# Patient Record
Sex: MALE | Race: WHITE | NOT HISPANIC OR LATINO | Employment: FULL TIME | URBAN - NONMETROPOLITAN AREA
[De-identification: names, ages, dates, MRNs, and addresses within clinical notes are randomized per-mention and may not be internally consistent; named-entity substitution may affect disease eponyms.]

---

## 2019-11-05 ENCOUNTER — OUTSIDE FACILITY SERVICE (OUTPATIENT)
Dept: CARDIOLOGY | Facility: CLINIC | Age: 64
End: 2019-11-05

## 2019-11-05 PROCEDURE — 99205 OFFICE O/P NEW HI 60 MIN: CPT | Performed by: INTERNAL MEDICINE

## 2019-11-06 ENCOUNTER — OUTSIDE FACILITY SERVICE (OUTPATIENT)
Dept: CARDIOLOGY | Facility: CLINIC | Age: 64
End: 2019-11-06

## 2019-11-06 DIAGNOSIS — R94.39 ABNORMAL NUCLEAR STRESS TEST: Primary | ICD-10-CM

## 2019-11-06 PROCEDURE — 99214 OFFICE O/P EST MOD 30 MIN: CPT | Performed by: INTERNAL MEDICINE

## 2019-11-07 ENCOUNTER — TELEPHONE (OUTPATIENT)
Dept: CARDIOLOGY | Facility: CLINIC | Age: 64
End: 2019-11-07

## 2019-11-07 NOTE — TELEPHONE ENCOUNTER
KOTA FROM THE [facility]  LINE CALLED SAID PATIENT NEEDED A FOLLOW UP IN 7-10 DAYS. WHEN DO YOU WANT PATIENT TO FOLLOW UP?

## 2019-11-08 ENCOUNTER — OUTSIDE FACILITY SERVICE (OUTPATIENT)
Dept: CARDIOLOGY | Facility: CLINIC | Age: 64
End: 2019-11-08

## 2019-11-08 PROCEDURE — 93458 L HRT ARTERY/VENTRICLE ANGIO: CPT | Performed by: INTERNAL MEDICINE

## 2019-11-08 NOTE — TELEPHONE ENCOUNTER
PATIENT IS SCHEDULED FOR 12/09/2019 @ 9:30 FOR A HOSP FOLLOW UP. CALLED KOTA AND SHE IS AWARE OF APPT AND I TOLD HER I HAD ALSO GIVEN THE APPT TO THE HOSP AFTER HIS HEART CATH.

## 2019-12-09 ENCOUNTER — OFFICE VISIT (OUTPATIENT)
Dept: CARDIOLOGY | Facility: CLINIC | Age: 64
End: 2019-12-09

## 2019-12-09 VITALS
DIASTOLIC BLOOD PRESSURE: 84 MMHG | HEART RATE: 72 BPM | BODY MASS INDEX: 34.36 KG/M2 | SYSTOLIC BLOOD PRESSURE: 138 MMHG | HEIGHT: 70 IN | WEIGHT: 240 LBS

## 2019-12-09 DIAGNOSIS — I10 ESSENTIAL HYPERTENSION: Primary | ICD-10-CM

## 2019-12-09 DIAGNOSIS — I25.9 IHD (ISCHEMIC HEART DISEASE): ICD-10-CM

## 2019-12-09 DIAGNOSIS — F17.200 CURRENT SMOKER ON SOME DAYS: ICD-10-CM

## 2019-12-09 DIAGNOSIS — E66.9 OBESITY (BMI 30.0-34.9): ICD-10-CM

## 2019-12-09 DIAGNOSIS — E78.5 HYPERLIPIDEMIA LDL GOAL <70: ICD-10-CM

## 2019-12-09 PROCEDURE — 99214 OFFICE O/P EST MOD 30 MIN: CPT | Performed by: NURSE PRACTITIONER

## 2019-12-09 RX ORDER — NITROGLYCERIN 0.4 MG/1
0.4 TABLET SUBLINGUAL
COMMUNITY
End: 2020-09-16 | Stop reason: SDUPTHER

## 2019-12-09 RX ORDER — ATORVASTATIN CALCIUM 40 MG/1
40 TABLET, FILM COATED ORAL EVERY OTHER DAY
COMMUNITY
End: 2019-12-09 | Stop reason: SDUPTHER

## 2019-12-09 RX ORDER — LOSARTAN POTASSIUM AND HYDROCHLOROTHIAZIDE 12.5; 1 MG/1; MG/1
1 TABLET ORAL DAILY
COMMUNITY
End: 2020-11-18

## 2019-12-09 RX ORDER — ATORVASTATIN CALCIUM 40 MG/1
40 TABLET, FILM COATED ORAL EVERY OTHER DAY
Qty: 90 TABLET | Refills: 2 | Status: SHIPPED | OUTPATIENT
Start: 2019-12-09 | End: 2020-11-18

## 2019-12-09 NOTE — PROGRESS NOTES
Chief Complaint   Patient presents with   • Hospital Follow-up     Patient had cardiac catheterization with stenting on 11/08/19. Patient also had stress, echo, and labs, in door with cath records. States that last night when he was trying to take a deep breath he was getting a little pain. Reports that he has had some shortness of breath since cath, states that it could be Brilinta. States that he has not had morning dose of Brilinta because he is out.   • Aspirin     Patient is on aspirin.    • Med Refill     Needs refills on Brilinta and atorvastatin. 90 day supplies to [facility]. Brought medications with visit.        Cardiac Complaints  dyspnea      Subjective   Raciel Peter is a 64 y.o. male with HTN, hyperlipidemia with statin intolerance, tobacco abuse, and IHD.  On 11/4/2019, he developed severe chest tightness/pressure in his chest returning from a home at work. Patient stated with the pain he became very fatigued and weak. He went to the ER at [facility] and given NTG paste which alleviated the symptoms after a few minutes. MI was ruled out by enzymes and EKG criteria.  Echo was read as normal but stress test read with anterior and inferior ischemia with normal LV function. It was decided to take the patient to cath lab to evaluate. Cardiac cath revealed a 75-85% stenosis of the LAD with FFR noted at 0.78, single resolute stent was placed to the proximal/mid portion of the LAD.  The diagonal then became jailed and PTCA was done.  At the end of the stent, there was another area of stenosis of around 75-85% and another single resolute stent was placed.  It was discussed to add PCSK9 for cholesterol management to be done as outpatient.  ASA was added along with Brilinta therapy.  It appears he was also added on atorvastatin at that time, despite prior admission of intolerance.    He returns today for follow up and new concerns are denied. Chest pain, dizziness, palpitations, and syncope are denied. He  does admit to some shortness of breath with Brilinta therapy but admits this has slowly improved. Patient does report taking with coffee.  He does state last night with deep breathing he developed a little ache in his chest after dinner but admits it was done with activity and with normal breathing as the night went on. He denies any pressure/pain similar to before admission to [facility].  He reports no decline in activity and states he is planning to resume walking regimen this week. He does admit to bleeding and Brilinta and ASA but denies any bleeding in urine or stools but reports that with hitting his arms/etc he bleeds more freely.  He is requesting Refills of Brilinta and lipitor as he is out of both.  He does report tolerating lipitor well so far at every other day dosing.  Labs from hospital in November show:  H/H 16.1/46.7, negative troponin x 3, Na 140, K 3.6, , BUN 17, Creatinine 1.4, TRIG 492, HDL 29, , ALT 21, AST 12, Mag 2.1.  Patient does report he has really been limiting his tobacco use and is down to 1-2 cigarettes nightly in regards.          Cardiac History  Past Surgical History:   Procedure Laterality Date   • CARDIAC CATHETERIZATION  11/08/2019    75-85% LAD. FFR- 0.78. 2.75x26 & 2.5x8 Resolute Stents. PTCA D1   • CARDIOVASCULAR STRESS TEST  11/05/2019    @ [facility]. Dr. Aguilar- 4 Min.35 Secs. 85% THR. EF 61%. Anterior & Inferior Ischemia.   • ECHO - CONVERTED  11/04/2019    @ [facility]. Dr. Hernandez- EF 60%. RVSP- 32 mmHg.       Current Outpatient Medications   Medication Sig Dispense Refill   • atorvastatin (LIPITOR) 40 MG tablet Take 1 tablet by mouth Every Other Day. 90 tablet 2   • losartan-hydrochlorothiazide (HYZAAR) 100-12.5 MG per tablet Take 1 tablet by mouth Daily.     • metoprolol tartrate (LOPRESSOR) 25 MG tablet Take 12.5 mg by mouth 2 (Two) Times a Day.     • nitroglycerin (NITROSTAT) 0.4 MG SL tablet Place 0.4 mg under the tongue Every 5 (Five) Minutes As Needed for Chest Pain.  "Take no more than 3 doses in 15 minutes.     • ticagrelor (BRILINTA) 90 MG tablet tablet Take 1 tablet by mouth 2 (Two) Times a Day. 180 tablet 2     No current facility-administered medications for this visit.        Shellfish-derived products    Past Medical History:   Diagnosis Date   • History of hernia surgery    • Hyperlipidemia    • Hypertension        Social History     Socioeconomic History   • Marital status: Single     Spouse name: Not on file   • Number of children: Not on file   • Years of education: Not on file   • Highest education level: Not on file   Tobacco Use   • Smoking status: Former Smoker     Years: 40.00     Last attempt to quit: 2019     Years since quittin.0   • Smokeless tobacco: Never Used   Substance and Sexual Activity   • Alcohol use: Never     Frequency: Never   • Drug use: Never       History reviewed. No pertinent family history.    Review of Systems   Constitution: Negative for malaise/fatigue and night sweats.   Cardiovascular: Positive for dyspnea on exertion. Negative for chest pain, claudication, irregular heartbeat, leg swelling, near-syncope, orthopnea, palpitations and syncope.   Respiratory: Positive for shortness of breath. Negative for cough and wheezing.    Musculoskeletal: Positive for joint pain and stiffness. Negative for back pain.   Gastrointestinal: Negative for anorexia, heartburn, nausea and vomiting.   Genitourinary: Negative for dysuria, hematuria, hesitancy and nocturia.   Neurological: Negative for dizziness, headaches, light-headedness and weakness.   Psychiatric/Behavioral: Negative for depression and memory loss. The patient is not nervous/anxious.            Objective     /84 (BP Location: Left arm)   Pulse 72   Ht 177.8 cm (70\")   Wt 109 kg (240 lb)   BMI 34.44 kg/m²     Physical Exam   Constitutional: He is oriented to person, place, and time. He appears well-developed and well-nourished.   HENT:   Head: Normocephalic and atraumatic. "   Eyes: Pupils are equal, round, and reactive to light. EOM are normal.   Neck: Normal range of motion. Neck supple.   Cardiovascular: Normal rate and regular rhythm.   Murmur heard.  Pulmonary/Chest: Effort normal and breath sounds normal.   Abdominal: Soft.   Musculoskeletal: Normal range of motion.   Neurological: He is alert and oriented to person, place, and time.   Skin: Skin is warm and dry.   Psychiatric: He has a normal mood and affect. His behavior is normal.       Procedures    Assessment/Plan     IHD:  Most recent cath findings discussed with patient with 70-80% stenosis of mid LAD stented with 2 resolute stents, with stenosis down to 0% after intervention.  He denies any cardiac concerns since stenting and admits to taking DAPT therapy as advised. Patient has not yet had morning Brilinta as he is out but will be picking up after visit as script was sent. Bleeding is reported with injury but excessive bleeding, hematuria, and bloody stools denied. Same continued. Caffeine with Brilinta urged to limit side effects such as shortness of breath. Answers in regards to cath findings and does and dont's after stenting discussed.    HTN:  Well managed on current ARB and BB therapy.  No adjustment to either recommended.  Patient encouraged to limit his sodium intake to aid in management.      Hyperlipidemia: Patient is now taking lipitor therapy since cath and reports doing well with QOD dosing, myalgias and other side effects denied. Most recent FLP from hospital shows TRIG well over 400.  Patient urged to continue with statin therapy and limit intake of carb/sugars.      He has limited his tobacco abuse and is now only smoking a cigarette a day and he was praised for his efforts. He was urged to continue with cessation fully.      BMI noted at 34.44.  Patient admits to weight gain since cath as he has been eating more since he is not smoking. Patient urged on limitation with carbs, calories, and restaurant/fast  food intake. He was also urged to begin exercise regimen once again with walking.     Refills of cardiac meds sent per request.    Patient does report having NTG SL to use if needed for chest pain.  Discussion about when to use was had.    6 month follow up recommended or sooner if needed. Patient urged to call with concerns.                Problems Addressed this Visit        Cardiovascular and Mediastinum    IHD (ischemic heart disease)    Relevant Medications    nitroglycerin (NITROSTAT) 0.4 MG SL tablet    metoprolol tartrate (LOPRESSOR) 25 MG tablet    ticagrelor (BRILINTA) 90 MG tablet tablet      Other Visit Diagnoses     Essential hypertension    -  Primary    Relevant Medications    metoprolol tartrate (LOPRESSOR) 25 MG tablet    losartan-hydrochlorothiazide (HYZAAR) 100-12.5 MG per tablet    Hyperlipidemia LDL goal <70        Relevant Medications    atorvastatin (LIPITOR) 40 MG tablet    Current smoker on some days        Obesity (BMI 30.0-34.9)              Patient's Body mass index is 34.44 kg/m². BMI is above normal parameters. Recommendations include: nutrition counseling.      Raciel KATHARINA Peter is a current  Some day smoker who is working hard on cessation.          Electronically signed by PHILIPP Amador December 9, 2019 4:50 PM

## 2020-06-11 PROBLEM — F17.210 CIGARETTE SMOKER: Status: ACTIVE | Noted: 2020-06-11

## 2020-06-11 PROBLEM — E78.00 PURE HYPERCHOLESTEROLEMIA: Status: ACTIVE | Noted: 2020-06-11

## 2020-06-11 PROBLEM — I10 ESSENTIAL HYPERTENSION: Status: ACTIVE | Noted: 2020-06-11

## 2020-09-16 ENCOUNTER — TELEPHONE (OUTPATIENT)
Dept: CARDIOLOGY | Facility: CLINIC | Age: 65
End: 2020-09-16

## 2020-09-16 RX ORDER — NITROGLYCERIN 0.4 MG/1
0.4 TABLET SUBLINGUAL
Qty: 25 TABLET | Refills: 1 | Status: SHIPPED | OUTPATIENT
Start: 2020-09-16 | End: 2022-07-26 | Stop reason: SDUPTHER

## 2020-09-16 RX ORDER — NITROGLYCERIN 0.4 MG/1
0.4 TABLET SUBLINGUAL
Qty: 25 TABLET | Refills: 1 | Status: SHIPPED | OUTPATIENT
Start: 2020-09-16 | End: 2020-09-16 | Stop reason: SDUPTHER

## 2020-09-16 NOTE — TELEPHONE ENCOUNTER
[facility] with [facility] called asking for script for nitrostat 0.4 mg SL tablet PRN. Script sent.

## 2020-11-18 ENCOUNTER — OFFICE VISIT (OUTPATIENT)
Dept: CARDIOLOGY | Facility: CLINIC | Age: 65
End: 2020-11-18

## 2020-11-18 ENCOUNTER — TELEPHONE (OUTPATIENT)
Dept: CARDIOLOGY | Facility: CLINIC | Age: 65
End: 2020-11-18

## 2020-11-18 VITALS
BODY MASS INDEX: 28.06 KG/M2 | TEMPERATURE: 98.8 F | HEIGHT: 70 IN | HEART RATE: 60 BPM | WEIGHT: 196 LBS | DIASTOLIC BLOOD PRESSURE: 62 MMHG | SYSTOLIC BLOOD PRESSURE: 118 MMHG

## 2020-11-18 DIAGNOSIS — F17.210 CIGARETTE SMOKER: ICD-10-CM

## 2020-11-18 DIAGNOSIS — I10 ESSENTIAL HYPERTENSION: ICD-10-CM

## 2020-11-18 DIAGNOSIS — I25.9 IHD (ISCHEMIC HEART DISEASE): Primary | ICD-10-CM

## 2020-11-18 DIAGNOSIS — E78.00 PURE HYPERCHOLESTEROLEMIA: ICD-10-CM

## 2020-11-18 DIAGNOSIS — Z79.899 MEDICATION MANAGEMENT: ICD-10-CM

## 2020-11-18 PROCEDURE — 99214 OFFICE O/P EST MOD 30 MIN: CPT | Performed by: NURSE PRACTITIONER

## 2020-11-18 RX ORDER — LOSARTAN POTASSIUM 100 MG/1
100 TABLET ORAL DAILY
Qty: 90 TABLET | Refills: 3 | Status: SHIPPED | OUTPATIENT
Start: 2020-11-18 | End: 2021-11-15

## 2020-11-18 NOTE — PROGRESS NOTES
Chief Complaint   Patient presents with   • Follow-up     for cardiac management   • Labs     PCP checked last week, to get results this afternoon   • Med Refill     refills needed on Brilinta, 90 days to [facility]   • Medication Problem     unable to take atorvastatin due to myalgia and severe headache.        Subjective       Raciel Peter is a 65 y.o. male with HTN, hyperlipidemia with statin intolerance, tobacco abuse, and IHD.  On 11/4/2019, he developed severe chest tightness/pressure in his chest returning from a home at work. Patient stated with the pain he became very fatigued and weak. He went to the ER at [facility] and given NTG paste which alleviated the symptoms after a few minutes. MI was ruled out by enzymes and EKG criteria.  Echo was read as normal but stress test read with anterior and inferior ischemia with normal LV function. It was decided to take the patient to cath lab to evaluate. Cardiac cath revealed a 75-85% stenosis of the LAD with FFR noted at 0.78, single resolute stent was placed to the proximal/mid portion of the LAD.  The diagonal then became jailed and PTCA was done.  At the end of the stent, there was another area of stenosis of around 75-85% and another single resolute stent was placed.  It was discussed to add PCSK9 for cholesterol management to be done as outpatient.  ASA was added along with Brilinta therapy.  It appears he was also added on atorvastatin at that time, despite prior admission of intolerance.    Today he comes to the office for a follow up visit. He has changed his diet and lost 40 pounds. He has cut back on smoking. He presents today without cardiac complaints. He is concerned about lower blood pressure since weight loss, which makes him feel a little weaker. No change in Hyzaar or Metoprolol noted.  Due to excessive bruising and bleeding he stopped aspirin but continues Brilinta without issue.        Cardiac History:    Past Surgical History:   Procedure Laterality  Date   • CARDIAC CATHETERIZATION  2019    75-85% LAD. FFR- 0.78. 2.75x26 & 2.5x8 Resolute Stents. PTCA D1   • CARDIOVASCULAR STRESS TEST  2019    @ [facility]. Dr. Aguilar- 4 Min.35 Secs. 85% THR. EF 61%. Anterior & Inferior Ischemia.   • ECHO - CONVERTED  2019    @ [facility]. Dr. Hernandez- EF 60%. RVSP- 32 mmHg.       Current Outpatient Medications   Medication Sig Dispense Refill   • metoprolol tartrate (LOPRESSOR) 25 MG tablet Take 0.5 tablets by mouth 2 (Two) Times a Day. 90 tablet 3   • nitroglycerin (NITROSTAT) 0.4 MG SL tablet Place 1 tablet under the tongue Every 5 (Five) Minutes As Needed for Chest Pain. Take no more than 3 doses in 15 minutes. Need appt for further refills. 25 tablet 1   • losartan (Cozaar) 100 MG tablet Take 1 tablet by mouth Daily. 90 tablet 3   • ticagrelor (Brilinta) 60 MG tablet tablet Take 1 tablet by mouth 2 (Two) Times a Day. 180 tablet 3     No current facility-administered medications for this visit.        Shellfish-derived products and Lipitor [atorvastatin calcium]    Past Medical History:   Diagnosis Date   • History of hernia surgery    • Hyperlipidemia    • Hypertension        Social History     Socioeconomic History   • Marital status: Single     Spouse name: Not on file   • Number of children: Not on file   • Years of education: Not on file   • Highest education level: Not on file   Tobacco Use   • Smoking status: Current Some Day Smoker     Years: 40.00     Types: Cigarettes     Last attempt to quit: 2019     Years since quittin.0   • Smokeless tobacco: Never Used   • Tobacco comment: trying to quit again    Substance and Sexual Activity   • Alcohol use: Never     Frequency: Never   • Drug use: Never       History reviewed. No pertinent family history.    Review of Systems   Constitution: Positive for malaise/fatigue (mild with lower blood pressure) and weight loss (intentional). Negative for decreased appetite and diaphoresis.   HENT: Negative for  "nosebleeds.    Eyes: Negative for blurred vision.   Cardiovascular: Negative for chest pain, claudication, cyanosis, dyspnea on exertion, irregular heartbeat, leg swelling, near-syncope, orthopnea, palpitations, paroxysmal nocturnal dyspnea and syncope.   Respiratory: Negative for shortness of breath.    Endocrine: Negative for cold intolerance and heat intolerance.   Hematologic/Lymphatic: Does not bruise/bleed easily.   Skin: Negative for rash.   Musculoskeletal: Negative for myalgias.   Gastrointestinal: Negative for heartburn, melena and nausea.   Genitourinary: Negative for dysuria and hematuria.   Neurological: Positive for vertigo (by history). Negative for dizziness, light-headedness, loss of balance and weakness.   Psychiatric/Behavioral: The patient does not have insomnia and is not nervous/anxious.         Objective      November 2019:  H/H 16.1/46.7, negative troponin x 3, Na 140, K 3.6, , BUN 17, Creatinine 1.4, TRIG 492, HDL 29, , ALT 21, AST 12, Mag 2.1.     /62   Pulse 60   Temp 98.8 °F (37.1 °C)   Ht 177.8 cm (70\")   Wt 88.9 kg (196 lb)   BMI 28.12 kg/m²     Vitals signs and nursing note reviewed.   Eyes:      Pupils: Pupils are equal, round, and reactive to light.   HENT:      Head: Normocephalic.   Neck:      Musculoskeletal: Normal range of motion.      Vascular: No carotid bruit.   Pulmonary:      Breath sounds: Normal breath sounds.   Cardiovascular:      Normal rate. Regular rhythm.   Abdominal:      General: Bowel sounds are normal.      Palpations: Abdomen is soft.   Musculoskeletal: Normal range of motion.   Skin:     General: Skin is warm.   Neurological:      Mental Status: Alert and oriented to person, place, and time.        Procedures: none today       Problem List Items Addressed This Visit        Cardiovascular and Mediastinum    IHD (ischemic heart disease) - Primary    Relevant Medications    ticagrelor (Brilinta) 60 MG tablet tablet    metoprolol tartrate " "(LOPRESSOR) 25 MG tablet    Essential hypertension    Relevant Medications    metoprolol tartrate (LOPRESSOR) 25 MG tablet    losartan (Cozaar) 100 MG tablet    Pure hypercholesterolemia       Other    Cigarette smoker      Other Visit Diagnoses     Medication management             IHD- asymptomatic. Continue brilinita at maintenance dose. Aspirin not restarted due to causing increased bruising and bleeding. We discussed cardiac workup next year unless symptoms or concerns develop sooner.     HTN- well controlled and reported a \"low\" at times per patient. Advised to stop Hyzaar and start Cozaar. Continue low dose Metoprolol tartrate. Continue to monitor blood pressure and call if not at goal.     Hypercholesterolemia- currently diet controlled. He has had labs drawn last week. Results requested. Further recommendations based on results. He may benefit in PCSK9 inhibitor as he has not tolerated Lipitor.     Patient's Body mass index is 28.12 kg/m². BMI is above normal parameters. Recommendations include: nutrition counseling. I complimented him on weight loss and to continue diet/exercise.      Raciel KATHARINA Peter  reports that he has been smoking cigarettes. He has smoked for the past 40.00 years. He has never used smokeless tobacco.. I have educated him on the risk of diseases from using tobacco products such as cancer, COPD and heart disease.  I advised him to quit and he is willing to quit. He is currently \"cutting back\".      Patient appears stable from a cardiac standpoint. Continue same plan of care. A 6 month follow up visit scheduled. Please call sooner for cardiac concerns..            Electronically signed by PHILIPP Saunders,  November 18, 2020 09:21 EST  "

## 2020-11-18 NOTE — TELEPHONE ENCOUNTER
Carlos from [facility] called concerning refill on Brilinta sent today, informed [facility], script for Brilinta should read Brilinta 60mg bid.

## 2020-11-19 RX ORDER — ROSUVASTATIN CALCIUM 10 MG/1
TABLET, COATED ORAL
Qty: 90 TABLET | Refills: 3 | Status: SHIPPED | OUTPATIENT
Start: 2020-11-19 | End: 2021-05-27 | Stop reason: DRUGHIGH

## 2020-11-19 NOTE — TELEPHONE ENCOUNTER
----- Message from PHILIPP Phillips sent at 11/18/2020  2:54 PM EST -----  Please inform B12 a little low. Renea will address when he sees her to establish care. Also PSA slightly increased 4.3. His  and , which he is currently controlling by diet. Historically he has TC in 400s. He did not tolerate LIpitor. Would he be willing to try a different statin? We could try Crestor 10 mg daily and see how he tolerates.

## 2020-11-19 NOTE — TELEPHONE ENCOUNTER
Pt aware of results and recommendations to discuss results with PCP, to start Crestor 10 mg daily. He would definitely be willing to try the Crestor.  I advised him to start with 1/2 tab and increase to 1 tab as tolerates. Aware to call if having any problems.

## 2021-03-03 ENCOUNTER — TELEPHONE (OUTPATIENT)
Dept: CARDIOLOGY | Facility: CLINIC | Age: 66
End: 2021-03-03

## 2021-05-20 ENCOUNTER — OFFICE VISIT (OUTPATIENT)
Dept: CARDIOLOGY | Facility: CLINIC | Age: 66
End: 2021-05-20

## 2021-05-20 ENCOUNTER — LAB (OUTPATIENT)
Dept: LAB | Facility: HOSPITAL | Age: 66
End: 2021-05-20

## 2021-05-20 VITALS
SYSTOLIC BLOOD PRESSURE: 142 MMHG | DIASTOLIC BLOOD PRESSURE: 80 MMHG | HEIGHT: 70 IN | HEART RATE: 72 BPM | BODY MASS INDEX: 31.5 KG/M2 | WEIGHT: 220 LBS

## 2021-05-20 DIAGNOSIS — I25.9 IHD (ISCHEMIC HEART DISEASE): Primary | ICD-10-CM

## 2021-05-20 DIAGNOSIS — Z79.899 MEDICATION MANAGEMENT: ICD-10-CM

## 2021-05-20 DIAGNOSIS — Z87.891 FORMER SMOKER: ICD-10-CM

## 2021-05-20 DIAGNOSIS — E78.00 PURE HYPERCHOLESTEROLEMIA: ICD-10-CM

## 2021-05-20 DIAGNOSIS — I10 ESSENTIAL HYPERTENSION: ICD-10-CM

## 2021-05-20 DIAGNOSIS — I25.9 IHD (ISCHEMIC HEART DISEASE): ICD-10-CM

## 2021-05-20 PROBLEM — F17.210 CIGARETTE SMOKER: Status: RESOLVED | Noted: 2020-06-11 | Resolved: 2021-05-20

## 2021-05-20 LAB
ALBUMIN SERPL-MCNC: 4.19 G/DL (ref 3.5–5.2)
ALBUMIN/GLOB SERPL: 1.7 G/DL
ALP SERPL-CCNC: 74 U/L (ref 39–117)
ALT SERPL W P-5'-P-CCNC: 21 U/L (ref 1–41)
ANION GAP SERPL CALCULATED.3IONS-SCNC: 7.2 MMOL/L (ref 5–15)
AST SERPL-CCNC: 17 U/L (ref 1–40)
BILIRUB SERPL-MCNC: 1.6 MG/DL (ref 0–1.2)
BUN SERPL-MCNC: 16 MG/DL (ref 8–23)
BUN/CREAT SERPL: 11.5 (ref 7–25)
CALCIUM SPEC-SCNC: 9.4 MG/DL (ref 8.6–10.5)
CHLORIDE SERPL-SCNC: 102 MMOL/L (ref 98–107)
CHOLEST SERPL-MCNC: 184 MG/DL (ref 0–200)
CO2 SERPL-SCNC: 27.8 MMOL/L (ref 22–29)
CREAT SERPL-MCNC: 1.39 MG/DL (ref 0.76–1.27)
DEPRECATED RDW RBC AUTO: 40.8 FL (ref 37–54)
ERYTHROCYTE [DISTWIDTH] IN BLOOD BY AUTOMATED COUNT: 12.3 % (ref 12.3–15.4)
GFR SERPL CREATININE-BSD FRML MDRD: 51 ML/MIN/1.73
GLOBULIN UR ELPH-MCNC: 2.4 GM/DL
GLUCOSE SERPL-MCNC: 108 MG/DL (ref 65–99)
HCT VFR BLD AUTO: 51.9 % (ref 37.5–51)
HDLC SERPL-MCNC: 43 MG/DL (ref 40–60)
HGB BLD-MCNC: 17.5 G/DL (ref 13–17.7)
LDLC SERPL CALC-MCNC: 120 MG/DL (ref 0–100)
LDLC/HDLC SERPL: 2.73 {RATIO}
MCH RBC QN AUTO: 30.8 PG (ref 26.6–33)
MCHC RBC AUTO-ENTMCNC: 33.7 G/DL (ref 31.5–35.7)
MCV RBC AUTO: 91.2 FL (ref 79–97)
PLATELET # BLD AUTO: 194 10*3/MM3 (ref 140–450)
PMV BLD AUTO: 9.3 FL (ref 6–12)
POTASSIUM SERPL-SCNC: 4.7 MMOL/L (ref 3.5–5.2)
PROT SERPL-MCNC: 6.6 G/DL (ref 6–8.5)
RBC # BLD AUTO: 5.69 10*6/MM3 (ref 4.14–5.8)
SODIUM SERPL-SCNC: 137 MMOL/L (ref 136–145)
TRIGL SERPL-MCNC: 119 MG/DL (ref 0–150)
VLDLC SERPL-MCNC: 21 MG/DL (ref 5–40)
WBC # BLD AUTO: 7.02 10*3/MM3 (ref 3.4–10.8)

## 2021-05-20 PROCEDURE — 80061 LIPID PANEL: CPT

## 2021-05-20 PROCEDURE — 80053 COMPREHEN METABOLIC PANEL: CPT

## 2021-05-20 PROCEDURE — 99213 OFFICE O/P EST LOW 20 MIN: CPT | Performed by: NURSE PRACTITIONER

## 2021-05-20 PROCEDURE — 85027 COMPLETE CBC AUTOMATED: CPT

## 2021-05-20 PROCEDURE — 36415 COLL VENOUS BLD VENIPUNCTURE: CPT

## 2021-05-20 NOTE — PATIENT INSTRUCTIONS
Advance Care Planning and Advance Directives     You make decisions on a daily basis - decisions about where you want to live, your career, your home, your life. Perhaps one of the most important decisions you face is your choice for future medical care. Take time to talk with your family and your healthcare team and start planning today.  Advance Care Planning is a process that can help you:  · Understand possible future healthcare decisions in light of your own experiences  · Reflect on those decision in light of your goals and values  · Discuss your decisions with those closest to you and the healthcare professionals that care for you  · Make a plan by creating a document that reflects your wishes    Surrogate Decision Maker  In the event of a medical emergency, which has left you unable to communicate or to make your own decisions, you would need someone to make decisions for you.  It is important to discuss your preferences for medical treatment with this person while you are in good health.     Qualities of a surrogate decision maker:  • Willing to take on this role and responsibility  • Knows what you want for future medical care  • Willing to follow your wishes even if they don't agree with them  • Able to make difficult medical decisions under stressful circumstances    Advance Directives  These are legal documents you can create that will guide your healthcare team and decision maker(s) when needed. These documents can be stored in the electronic medical record.    · Living Will - a legal document to guide your care if you have a terminal condition or a serious illness and are unable to communicate. States vary by statute in document names/types, but most forms may include one or more of the following:        -  Directions regarding life-prolonging treatments        -  Directions regarding artificially provided nutrition/hydration        -  Choosing a healthcare decision maker        -  Direction  regarding organ/tissue donation    · Durable Power of  for Healthcare - this document names an -in-fact to make medical decisions for you, but it may also allow this person to make personal and financial decisions for you. Please seek the advice of an  if you need this type of document.    **Advance Directives are not required and no one may discriminate against you if you do not sign one.    Medical Orders  Many states allow specific forms/orders signed by your physician to record your wishes for medical treatment in your current state of health. This form, signed in personal communication with your physician, addresses resuscitation and other medical interventions that you may or may not want.      For more information or to schedule a time with a [facility] Advance Care Planning Facilitator contact: [email or web address]  Food Choices for Gastroesophageal Reflux Disease, Adult  When you have gastroesophageal reflux disease (GERD), the foods you eat and your eating habits are very important. Choosing the right foods can help ease the discomfort of GERD. Consider working with a dietitian to help you make healthy food choices.  What are tips for following this plan?  Reading food labels  · Read the label for foods that are low in saturated fat. Foods that have less than 5% of daily value (DV) of fat and 0 g of trans fats may help with your symptoms.  Cooking  · Cook foods using methods other than frying. This may include baking, steaming, grilling, or broiling. These are all methods that do not need a lot of fat for cooking.  · To add flavor, try to use herbs that are low in spice and acidity.  Meal planning    · Choose healthy foods that are low in fat, such as fruits, vegetables, whole grains, low-fat dairy products, lean meats, fish, and poultry.  · Eat frequent, small meals instead of three large meals each day. Eat your meals slowly, in a relaxed setting. Avoid bending over or lying down  until 2-3 hours after eating.  · Limit high-fat foods such as fatty meats or fried foods.  · Limit your intake of oils, butter, and shortening to less than 8 teaspoons each day.  · Avoid the following:  ? Foods that cause symptoms. These may be different for different people. Keep a food diary to keep track of foods that cause symptoms.  ? Alcohol.  ? Drinking large amounts of liquid with meals.  ? Eating meals during the 2-3 hours before bed.  Lifestyle  · Maintain a healthy weight. Ask your health care provider what weight is healthy for you. If you need to lose weight, work with your health care provider to do so safely.  · Exercise for at least 30 minutes on 5 or more days each week, or as told by your health care provider.  · Avoid wearing clothes that fit tightly around your waist and chest.  · Do not use any products that contain nicotine or tobacco, such as cigarettes, e-cigarettes, and chewing tobacco. If you need help quitting, ask your health care provider.  · Sleep with the head of your bed raised. Use a wedge under the mattress or blocks under the bed frame to raise the head of the bed.  What foods should I eat?    Eat a healthy, well-balanced diet of fruits, vegetables, whole grains, low-fat dairy products, lean meats, fish, and poultry. Each person is different. Foods that may trigger symptoms in one person may not trigger any symptoms in another person. Work with your health care provider to identify foods that are safe for you.  The items listed above may not be a complete list of foods and beverages you can eat. Contact a dietitian for more information.  What foods should I avoid?  Limiting some of these foods may help in managing the symptoms of GERD. Everyone is different. Consult a dietitian or your health care provider to help you identify the exact foods to avoid, if any.  Fruits  Any fruits prepared with added fat. Any fruits that cause symptoms. For some people this may include citrus  fruits, such as oranges, grapefruit, pineapple, and armond.  Vegetables  Deep-fried vegetables. French fries. Any vegetables prepared with added fat. Any vegetables that cause symptoms. For some people, this may include tomatoes and tomato products, chili peppers, onions and garlic, and horseradish.  Grains  Pastries or quick breads with added fat.  Meats and other proteins  High-fat meats, such as fatty beef or pork, hot dogs, ribs, ham, sausage, salami, and orourke. Fried meat or protein, including fried fish and fried chicken. Nuts and nut butters.  Dairy  Whole milk and chocolate milk. Sour cream. Cream. Ice cream. Cream cheese. Milkshakes.  Fats and oils  Butter. Margarine. Shortening. Ghee.  Beverages  Coffee and tea, with or without caffeine. Carbonated beverages. Sodas. Energy drinks. Fruit juice made with acidic fruits (such as orange or grapefruit). Tomato juice. Alcoholic drinks.  Sweets and desserts  Chocolate and cocoa. Donuts.  Seasonings and condiments  Pepper. Peppermint and spearmint. Added salt. Any condiments, herbs, or seasonings that cause symptoms. For some people, this may include alvarez, hot sauce, or vinegar-based salad dressings.  The items listed above may not be a complete list of foods and beverages you should avoid. Contact a dietitian for more information.  Questions to ask your health care provider  Diet and lifestyle changes are usually the first steps that are taken to manage symptoms of GERD. If diet and lifestyle changes do not improve your symptoms, talk with your health care provider about taking medicines.  Where to find more information  · International [employer] for Gastrointestinal Disorders: aboutgerd.org  Summary  · When you have gastroesophageal reflux disease (GERD), food and lifestyle choices may be very helpful in easing the discomfort of GERD.  · Eat frequent, small meals instead of three large meals each day. Eat your meals slowly, in a relaxed setting. Avoid bending  over or lying down until 2-3 hours after eating.  · Limit high-fat foods such as fatty meat or fried foods.  This information is not intended to replace advice given to you by your health care provider. Make sure you discuss any questions you have with your health care provider.  Document Revised: 10/12/2020 Document Reviewed: 10/12/2020  [employer] Patient Education © 2021 [employer]  m/ACP or call [phone] and someone will contact you directly.

## 2021-05-20 NOTE — PROGRESS NOTES
Chief Complaint   Patient presents with   • Follow-up     Cardiac managment , had ER visit 3-2021 for chest pain which was indigestion. Notes obtained for review.   • LABS     Labs march 2021    • Med Refill     No refills needed . Reviewed meds verbally       Subjective       Raciel Peter is a 65 y.o. male with HTN, hyperlipidemia with statin intolerance, tobacco abuse, and IHD.  On 11/4/2019, he developed severe chest tightness/pressure in his chest returning from a home at work. Patient stated with the pain he became very fatigued and weak. He went to the ER at [facility] and given NTG paste which alleviated the symptoms after a few minutes. MI was ruled out by enzymes and EKG criteria.  Echo was read as normal but stress test read with anterior and inferior ischemia with normal LV function. It was decided to take the patient to cath lab to evaluate. Cardiac cath revealed a 75-85% stenosis of the LAD with FFR noted at 0.78, single resolute stent was placed to the proximal/mid portion of the LAD.  The diagonal then became jailed and PTCA was done.  At the end of the stent, there was another area of stenosis of around 75-85% and another single resolute stent was placed.  It was discussed to add PCSK9 for cholesterol management to be done as outpatient.  ASA was added along with Brilinta therapy.  It appears he was also added on atorvastatin at that time, despite prior admission of intolerance. Later changed to Crestor.      Today returns to the office for a follow-up visit. On 3/3/21 he went to ER with chest pain/pressure.  EKG and labs were unremarkable.  After treatment with GI cocktail his symptoms resolved.  He denies reoccurrence of chest pain or other cardiac symptoms.  He has been maintaining smoking cessation.  Since he has stopped smoking he has gained about 20 pounds which he feels contributed to his GI symptoms.       Cardiac History:    Past Surgical History:   Procedure Laterality Date   • CARDIAC  CATHETERIZATION  2019    75-85% LAD. FFR- 0.78. 2.75x26 & 2.5x8 Resolute Stents. PTCA D1   • CARDIOVASCULAR STRESS TEST  2019    @ [facility]. Dr. Aguilar- 4 Min.35 Secs. 85% THR. EF 61%. Anterior & Inferior Ischemia.   • ECHO - CONVERTED  2019    @ [facility]. Dr. Hernandez- EF 60%. RVSP- 32 mmHg.       Current Outpatient Medications   Medication Sig Dispense Refill   • losartan (Cozaar) 100 MG tablet Take 1 tablet by mouth Daily. 90 tablet 3   • metoprolol tartrate (LOPRESSOR) 25 MG tablet Take 0.5 tablets by mouth 2 (Two) Times a Day. 90 tablet 3   • nitroglycerin (NITROSTAT) 0.4 MG SL tablet Place 1 tablet under the tongue Every 5 (Five) Minutes As Needed for Chest Pain. Take no more than 3 doses in 15 minutes. Need appt for further refills. 25 tablet 1   • rosuvastatin (CRESTOR) 10 MG tablet Start with 1/2 tab daily and increase to 1 tab daily as tolerates 90 tablet 3   • ticagrelor (Brilinta) 60 MG tablet tablet Take 1 tablet by mouth 2 (Two) Times a Day. 180 tablet 3     No current facility-administered medications for this visit.       Shellfish-derived products and Lipitor [atorvastatin calcium]    Past Medical History:   Diagnosis Date   • History of hernia surgery    • Hyperlipidemia    • Hypertension        Social History     Socioeconomic History   • Marital status: Single     Spouse name: Not on file   • Number of children: Not on file   • Years of education: Not on file   • Highest education level: Not on file   Tobacco Use   • Smoking status: Former Smoker     Years: 40.00     Types: Cigarettes     Quit date: 2020     Years since quittin.4   • Smokeless tobacco: Never Used   • Tobacco comment: trying to quit again    Vaping Use   • Vaping Use: Never used   Substance and Sexual Activity   • Alcohol use: Never   • Drug use: Never       History reviewed. No pertinent family history.    Review of Systems   Constitutional: Positive for weight gain. Negative for decreased appetite, diaphoresis  "and malaise/fatigue.   HENT: Negative for nosebleeds.    Eyes: Negative for blurred vision.   Cardiovascular: Negative for chest pain, claudication, cyanosis, dyspnea on exertion, irregular heartbeat, leg swelling, near-syncope, orthopnea, palpitations, paroxysmal nocturnal dyspnea and syncope.   Respiratory: Negative for shortness of breath.    Endocrine: Negative for cold intolerance and heat intolerance.   Hematologic/Lymphatic: Negative for adenopathy. Does not bruise/bleed easily.   Skin: Negative for rash.   Musculoskeletal: Negative for myalgias.   Gastrointestinal: Positive for heartburn (Not often). Negative for melena and nausea.   Genitourinary: Negative for dysuria and hematuria.   Neurological: Negative for dizziness and light-headedness.   Psychiatric/Behavioral: The patient does not have insomnia and is not nervous/anxious.         BP Readings from Last 5 Encounters:   05/20/21 142/80   11/18/20 118/62   12/09/19 138/84       Wt Readings from Last 5 Encounters:   05/20/21 99.8 kg (220 lb)   11/18/20 88.9 kg (196 lb)   12/09/19 109 kg (240 lb)       Objective      Labs 03/03/2021: Sodium 136, potassium 4.3, chloride 101, carbon dioxide 29, BUN 21, creatinine 1.6, estimated creatinine clearance 62, glucose 137, calcium 9.1, magnesium 2.1, total bili 1.5, AST 22, ALT 65, , troponin negative, total protein seven, albumin 3.8, WBC 10.2, RBC 5.62, hemoglobin 17.8, hematocrit 51.5, platelets 238    /80 (BP Location: Left arm)   Pulse 72   Ht 177.8 cm (70\")   Wt 99.8 kg (220 lb)   BMI 31.57 kg/m²     Vitals and nursing note reviewed.   Constitutional:       Appearance: Not in distress.   Eyes:      Pupils: Pupils are equal, round, and reactive to light.   HENT:      Head: Normocephalic.   Neck:      Vascular: No carotid bruit.   Pulmonary:      Effort: Pulmonary effort is normal.      Breath sounds: Normal breath sounds.   Cardiovascular:      Normal rate. Regular rhythm.   Edema:     " Peripheral edema absent.   Abdominal:      General: Bowel sounds are normal.      Palpations: Abdomen is soft.      Tenderness: There is no abdominal tenderness.   Musculoskeletal: Normal range of motion.      Cervical back: Normal range of motion. Skin:     General: Skin is warm.   Neurological:      Mental Status: Alert and oriented to person, place, and time.          Procedures: none today          Assessment/Plan   Diagnoses and all orders for this visit:    1. IHD (ischemic heart disease) (Primary)  -     Comprehensive Metabolic Panel; Future    2. Essential hypertension  -     CBC (No Diff); Future    3. Pure hypercholesterolemia  -     Lipid Panel; Future  -     Comprehensive Metabolic Panel; Future    4. Medication management  -     Lipid Panel; Future  -     CBC (No Diff); Future  -     Comprehensive Metabolic Panel; Future    5. Former smoker      IHD-patient underwent cath with stenting in November 2019.  He presents today without cardiac complaints or concerns.  Due to recent ER visit we discussed repeat cardiac work-up but he declines at this time feeling it was GI in nature and no recurrent symptoms.  Should should any symptoms or concerns develop he understands to call the office and repeat cardiac work-up can be advised.  Otherwise we will discussed repeating cardiac work-up in 2 to 3 years post stent placement.  Continue maintenance dose of Brilinta as he denies increased bruising or signs of bleeding.  Continue statin therapy and antihypertensive agents.    Hypertension-BP controlled.  Continue medication management.  DASH diet weight loss encouraged.    Hypercholesterolemia-currently on Crestor without side effects noted.  Lab order given to reassess lipid panel.    Patient's Body mass index is 31.57 kg/m². indicating that he is obese (BMI >30). Obesity-related health conditions include the following: hypertension and coronary heart disease. Obesity is worsening. BMI is is above average; BMI  management plan is completed. We discussed portion control and increasing exercise..     Patient recently stopped smoking for which I complemented his decision.    A 6 month follow up visit scheduled. Please call sooner for cardiac concerns.            Electronically signed by PHILIPP Saunders,  May 21, 2021 10:24 EDT

## 2021-05-27 RX ORDER — ROSUVASTATIN CALCIUM 20 MG/1
20 TABLET, COATED ORAL DAILY
Qty: 90 TABLET | Refills: 3 | Status: SHIPPED | OUTPATIENT
Start: 2021-05-27 | End: 2021-12-09 | Stop reason: SDUPTHER

## 2021-05-27 NOTE — TELEPHONE ENCOUNTER
Patient made aware of lab results and recommendations. He has been taking crestor 10mg daily, he is willing to increase dose to 20mg daily. Aware if has more heart burn may need US of gall bladder.

## 2021-08-19 RX ORDER — TICAGRELOR 60 MG/1
TABLET ORAL
Qty: 180 TABLET | Refills: 1 | Status: SHIPPED | OUTPATIENT
Start: 2021-08-19 | End: 2021-12-09 | Stop reason: SDUPTHER

## 2021-11-15 RX ORDER — LOSARTAN POTASSIUM 100 MG/1
100 TABLET ORAL DAILY
Qty: 90 TABLET | Refills: 2 | Status: SHIPPED | OUTPATIENT
Start: 2021-11-15 | End: 2021-12-09 | Stop reason: SDUPTHER

## 2021-12-09 ENCOUNTER — OFFICE VISIT (OUTPATIENT)
Dept: CARDIOLOGY | Facility: CLINIC | Age: 66
End: 2021-12-09

## 2021-12-09 VITALS
WEIGHT: 217 LBS | TEMPERATURE: 96.9 F | SYSTOLIC BLOOD PRESSURE: 130 MMHG | BODY MASS INDEX: 31.07 KG/M2 | HEIGHT: 70 IN | HEART RATE: 80 BPM | DIASTOLIC BLOOD PRESSURE: 88 MMHG

## 2021-12-09 DIAGNOSIS — I10 ESSENTIAL HYPERTENSION: ICD-10-CM

## 2021-12-09 DIAGNOSIS — I25.9 IHD (ISCHEMIC HEART DISEASE): Primary | ICD-10-CM

## 2021-12-09 DIAGNOSIS — E78.00 PURE HYPERCHOLESTEROLEMIA: ICD-10-CM

## 2021-12-09 DIAGNOSIS — E55.9 VITAMIN D DEFICIENCY: ICD-10-CM

## 2021-12-09 DIAGNOSIS — Z79.899 MEDICATION MANAGEMENT: ICD-10-CM

## 2021-12-09 DIAGNOSIS — E53.8 VITAMIN B 12 DEFICIENCY: ICD-10-CM

## 2021-12-09 PROCEDURE — 99214 OFFICE O/P EST MOD 30 MIN: CPT | Performed by: NURSE PRACTITIONER

## 2021-12-09 RX ORDER — ASPIRIN 81 MG/1
81 TABLET ORAL DAILY
Start: 2021-12-09 | End: 2022-07-25 | Stop reason: SDUPTHER

## 2021-12-09 RX ORDER — LOSARTAN POTASSIUM 100 MG/1
100 TABLET ORAL DAILY
Qty: 90 TABLET | Refills: 3 | Status: SHIPPED | OUTPATIENT
Start: 2021-12-09 | End: 2022-07-25 | Stop reason: SDUPTHER

## 2021-12-09 RX ORDER — ROSUVASTATIN CALCIUM 20 MG/1
20 TABLET, COATED ORAL DAILY
Qty: 90 TABLET | Refills: 3 | Status: SHIPPED | OUTPATIENT
Start: 2021-12-09 | End: 2022-07-25 | Stop reason: SDUPTHER

## 2021-12-09 NOTE — PATIENT INSTRUCTIONS
Mediterranean Diet  A Mediterranean diet refers to food and lifestyle choices that are based on the traditions of countries located on the [place]. This way of eating has been shown to help prevent certain conditions and improve outcomes for people who have chronic diseases, like kidney disease and heart disease.  What are tips for following this plan?  Lifestyle  · Cook and eat meals together with your family, when possible.  · Drink enough fluid to keep your urine clear or pale yellow.  · Be physically active every day. This includes:  ? Aerobic exercise like running or swimming.  ? Leisure activities like gardening, walking, or housework.  · Get 7-8 hours of sleep each night.  · If recommended by your health care provider, drink red wine in moderation. This means 1 glass a day for nonpregnant women and 2 glasses a day for men. A glass of wine equals 5 oz (150 mL).  Reading food labels    · Check the serving size of packaged foods. For foods such as rice and pasta, the serving size refers to the amount of cooked product, not dry.  · Check the total fat in packaged foods. Avoid foods that have saturated fat or trans fats.  · Check the ingredients list for added sugars, such as corn syrup.    Shopping  · At the grocery store, buy most of your food from the areas near the walls of the store. This includes:  ? Fresh fruits and vegetables (produce).  ? Grains, beans, nuts, and seeds. Some of these may be available in unpackaged forms or large amounts (in bulk).  ? Fresh seafood.  ? Poultry and eggs.  ? Low-fat dairy products.  · Buy whole ingredients instead of prepackaged foods.  · Buy fresh fruits and vegetables in-season from local farmers markets.  · Buy frozen fruits and vegetables in resealable bags.  · If you do not have access to quality fresh seafood, buy precooked frozen shrimp or canned fish, such as tuna, salmon, or sardines.  · Buy small amounts of raw or cooked vegetables, salads, or olives from  the deli or salad bar at your store.  · Stock your pantry so you always have certain foods on hand, such as olive oil, canned tuna, canned tomatoes, rice, pasta, and beans.  Cooking  · Cook foods with extra-virgin olive oil instead of using butter or other vegetable oils.  · Have meat as a side dish, and have vegetables or grains as your main dish. This means having meat in small portions or adding small amounts of meat to foods like pasta or stew.  · Use beans or vegetables instead of meat in common dishes like chili or lasagna.  · [place] with different cooking methods. Try roasting or broiling vegetables instead of steaming or sautéeing them.  · Add frozen vegetables to soups, stews, pasta, or rice.  · Add nuts or seeds for added healthy fat at each meal. You can add these to yogurt, salads, or vegetable dishes.  · Marinate fish or vegetables using olive oil, lemon juice, garlic, and fresh herbs.  Meal planning    · Plan to eat 1 vegetarian meal one day each week. Try to work up to 2 vegetarian meals, if possible.  · Eat seafood 2 or more times a week.  · Have healthy snacks readily available, such as:  ? Vegetable sticks with hummus.  ? Greek yogurt.  ? Fruit and nut trail mix.  · Eat balanced meals throughout the week. This includes:  ? Fruit: 2-3 servings a day  ? Vegetables: 4-5 servings a day  ? Low-fat dairy: 2 servings a day  ? Fish, poultry, or lean meat: 1 serving a day  ? Beans and legumes: 2 or more servings a week  ? Nuts and seeds: 1-2 servings a day  ? Whole grains: 6-8 servings a day  ? Extra-virgin olive oil: 3-4 servings a day  · Limit red meat and sweets to only a few servings a month    What are my food choices?  · Mediterranean diet  ? Recommended  § Grains: Whole-grain pasta. Brown rice. Bulgar wheat. Polenta. Couscous. Whole-wheat bread. Oatmeal. Quinoa.  § Vegetables: Artichokes. Beets. Broccoli. Cabbage. Carrots. Eggplant. Green beans. Chard. Kale. Spinach. Onions. Leeks. Peas. Squash.  Tomatoes. Peppers. Radishes.  § Fruits: Apples. Apricots. Avocado. Berries. Bananas. Cherries. Dates. Figs. Grapes. [place]. Melon. Oranges. Peaches. Plums. Pomegranate.  § Meats and other protein foods: Beans. Almonds. Sunflower seeds. Pine nuts. Peanuts. Cod. [place]. Scallops. Shrimp. Tuna. Tilapia. Clams. Oysters. Eggs.  § Dairy: Low-fat milk. Cheese. Greek yogurt.  § Beverages: Water. Red wine. Herbal tea.  § Fats and oils: Extra virgin olive oil. Avocado oil. Grape seed oil.  § Sweets and desserts: Greek yogurt with honey. Baked apples. Poached pears. Trail mix.  § Seasoning and other foods: Basil. Cilantro. Coriander. Cumin. Mint. Parsley. [place]. Rosemary. Tarragon. Garlic. Oregano. Thyme. Pepper. Balsalmic vinegar. Tahini. Hummus. Tomato sauce. Olives. Mushrooms.  ? Limit these  § Grains: Prepackaged pasta or rice dishes. Prepackaged cereal with added sugar.  § Vegetables: Deep fried potatoes (french fries).  § Fruits: Fruit canned in syrup.  § Meats and other protein foods: Beef. Pork. Lamb. Poultry with skin. Hot dogs. Busch.  § Dairy: Ice cream. Sour cream. Whole milk.  § Beverages: Juice. Sugar-sweetened soft drinks. Beer. Liquor and spirits.  § Fats and oils: Butter. Canola oil. Vegetable oil. Beef fat (tallow). Lard.  § Sweets and desserts: Cookies. Cakes. Pies. Candy.  § Seasoning and other foods: Mayonnaise. Premade sauces and marinades.  The items listed may not be a complete list. Talk with your dietitian about what dietary choices are right for you.  Summary  · The Mediterranean diet includes both food and lifestyle choices.  · Eat a variety of fresh fruits and vegetables, beans, nuts, seeds, and whole grains.  · Limit the amount of red meat and sweets that you eat.  · Talk with your health care provider about whether it is safe for you to drink red wine in moderation. This means 1 glass a day for nonpregnant women and 2 glasses a day for men. A glass of wine equals 5 oz (150 mL).  This information  is not intended to replace advice given to you by your health care provider. Make sure you discuss any questions you have with your health care provider.  Document Revised: 08/17/2017 Document Reviewed: 08/10/2017  [employer] Patient Education © 2020 [employer]

## 2021-12-09 NOTE — PROGRESS NOTES
Chief Complaint   Patient presents with   • Follow-up     Cardiac management   • Lab     Last labs in chart.   • Med Refill     Needs refills on Crestor, Brilinta,  and Metoprolol -90 day.     Subjective       Raciel Pteer is a 66 y.o. male with HTN, hyperlipidemia with statin intolerance, tobacco use, and IHD.  On 11/4/2019, he developed severe chest tightness/pressure while working. He became very fatigued and weak. He went to the ER at [facility], given NTG paste which alleviated the symptoms after a few minutes. MI was ruled out by enzymes and EKG criteria.  Echo was read as normal but stress test showed anterior and inferior ischemia with normal LV function. It was decided to take the patient to cath lab to evaluate. Cardiac cath revealed a 75-85% stenosis of the LAD with FFR noted at 0.78, single resolute stent was placed to the proximal/mid portion of the LAD.  The diagonal then became jailed and PTCA was done.  At the end of the stent, there was another area of stenosis of around 75-85% and another single resolute stent was placed.  It was discussed to add PCSK9 for cholesterol management to be done as outpatient.  ASA and Brilinta prescribed. At discharge, Lipitor started then later changed to Crestor which has been better tolerated.     He returns today for follow up. He denies new or worsening cardiac symptoms. No chest pain, inappropriate palpitations, dizziness. Blood pressure stable. He unfortunately resumed smoking secondary to stressors.  Reviewing med list, he is not taking aspirin. Labs 5/2021: glucose 108, BUN/Cr 16/1.39, GFR 51, Bili 1.6. , Tri 119, HDL 43, , Crestor increased from 10 mg to 20 mg. B12 156 11/2020.           Cardiac History:    Past Surgical History:   Procedure Laterality Date   • CARDIAC CATHETERIZATION  11/08/2019    75-85% LAD. FFR- 0.78. 2.75x26 & 2.5x8 Resolute Stents. PTCA D1   • CARDIOVASCULAR STRESS TEST  11/05/2019    @ [facility]. Dr. Aguilar- 4 Min.35 Secs. 85% THR.  EF 61%. Anterior & Inferior Ischemia.   • ECHO - CONVERTED  11/04/2019    @ [facility]. Dr. Hernandez- EF 60%. RVSP- 32 mmHg.     Current Outpatient Medications   Medication Sig Dispense Refill   • losartan (COZAAR) 100 MG tablet Take 1 tablet by mouth Daily. 90 tablet 3   • metoprolol tartrate (LOPRESSOR) 25 MG tablet Take 0.5 tablets by mouth 2 (Two) Times a Day. 90 tablet 3   • nitroglycerin (NITROSTAT) 0.4 MG SL tablet Place 1 tablet under the tongue Every 5 (Five) Minutes As Needed for Chest Pain. Take no more than 3 doses in 15 minutes. Need appt for further refills. 25 tablet 1   • rosuvastatin (CRESTOR) 20 MG tablet Take 1 tablet by mouth Daily. 90 tablet 3   • ticagrelor (Brilinta) 60 MG tablet tablet Take 1 tablet by mouth 2 (Two) Times a Day. 180 tablet 3   • aspirin (aspirin) 81 MG EC tablet Take 1 tablet by mouth Daily.       No current facility-administered medications for this visit.     Shellfish-derived products and Lipitor [atorvastatin calcium]    Past Medical History:   Diagnosis Date   • History of hernia surgery    • Hyperlipidemia    • Hypertension      Social History     Socioeconomic History   • Marital status: Single   Tobacco Use   • Smoking status: Current Every Day Smoker     Packs/day: 1.00     Years: 40.00     Pack years: 40.00     Types: Cigarettes   • Smokeless tobacco: Never Used   • Tobacco comment: trying to quit again    Vaping Use   • Vaping Use: Never used   Substance and Sexual Activity   • Alcohol use: Never   • Drug use: Never     History reviewed. No pertinent family history.    Review of Systems   Constitutional: Positive for weight loss (3 lb). Negative for decreased appetite and malaise/fatigue.   HENT: Negative.    Eyes: Negative for blurred vision.   Cardiovascular: Negative for chest pain, dyspnea on exertion, leg swelling, palpitations and syncope.   Respiratory: Negative for shortness of breath and sleep disturbances due to breathing.    Endocrine: Negative.   "  Hematologic/Lymphatic: Negative for bleeding problem. Does not bruise/bleed easily.   Skin: Negative.    Musculoskeletal: Negative for falls and myalgias.   Gastrointestinal: Negative for abdominal pain, heartburn and melena.   Genitourinary: Negative for hematuria.   Neurological: Negative for dizziness and light-headedness.   Psychiatric/Behavioral: Negative for altered mental status.   Allergic/Immunologic: Negative.       Objective     /88 (BP Location: Left arm)   Pulse 80   Temp 96.9 °F (36.1 °C)   Ht 177.8 cm (70\")   Wt 98.4 kg (217 lb)   BMI 31.14 kg/m²     Vitals and nursing note reviewed.   Constitutional:       General: Not in acute distress.     Appearance: Well-developed. Not diaphoretic.   Eyes:      Pupils: Pupils are equal, round, and reactive to light.   HENT:      Head: Normocephalic.   Pulmonary:      Effort: Pulmonary effort is normal. No respiratory distress.      Breath sounds: Normal breath sounds.   Cardiovascular:      Normal rate. Regular rhythm.      Murmurs: There is a grade 2/6 systolic murmur at the URSB.   Pulses:     Intact distal pulses.   Abdominal:      General: Bowel sounds are normal.      Palpations: Abdomen is soft.   Musculoskeletal: Normal range of motion.      Cervical back: Normal range of motion. Skin:     General: Skin is warm and dry.   Neurological:      Mental Status: Alert and oriented to person, place, and time.        Procedures          Problem List Items Addressed This Visit        Cardiac and Vasculature    IHD (ischemic heart disease) - Primary    Relevant Medications    ticagrelor (Brilinta) 60 MG tablet tablet    metoprolol tartrate (LOPRESSOR) 25 MG tablet    Other Relevant Orders    Comprehensive Metabolic Panel    CBC (No Diff)    Essential hypertension    Relevant Medications    metoprolol tartrate (LOPRESSOR) 25 MG tablet    losartan (COZAAR) 100 MG tablet    Other Relevant Orders    Comprehensive Metabolic Panel    CBC (No Diff)    TSH    " Pure hypercholesterolemia    Relevant Medications    rosuvastatin (CRESTOR) 20 MG tablet    Other Relevant Orders    Lipid Panel      Other Visit Diagnoses     Medication management        Relevant Orders    Comprehensive Metabolic Panel    CBC (No Diff)    Vitamin D deficiency        Relevant Orders    Vitamin D 25 Hydroxy    Vitamin B 12 deficiency        Relevant Orders    Vitamin B12         1. CAD- s/p stenting LAD x2, PTCA diagonal 2019. No anginal symptoms. Continue Brilinta 60 mg. Add aspirin 81 mg to reduce risk of restenosis as he has resumed smoking. Will plan to repeat stress test next year to evaluate stent patency or sooner if he develops any anginal symptoms.     2. HTN- borderline today. Continue same meds, losartan, metoprolol, Limit na.     3. Hyperlipidemia- improved with Crestor 10 mg, dose increased to 20 mg May 2021 after LDL remained above goal at 120. Will repeat lipid panel, LFT. Appears to tolerate statin.  Mediterranean diet recommended.     Patient's Body mass index is 31.14 kg/m². indicating that he is obese (BMI >30).     Raciel Peter  reports that he has been smoking cigarettes. He has a 40.00 pack-year smoking history. He has never used smokeless tobacco. Smoking cessation encouraged. He declined NRT.              Electronically signed by PHILIPP Dunbar,  December 12, 2021 15:09 EST

## 2022-01-11 ENCOUNTER — LAB (OUTPATIENT)
Dept: LAB | Facility: HOSPITAL | Age: 67
End: 2022-01-11

## 2022-01-11 PROCEDURE — 80061 LIPID PANEL: CPT | Performed by: NURSE PRACTITIONER

## 2022-01-11 PROCEDURE — 85027 COMPLETE CBC AUTOMATED: CPT | Performed by: NURSE PRACTITIONER

## 2022-01-11 PROCEDURE — 80053 COMPREHEN METABOLIC PANEL: CPT | Performed by: NURSE PRACTITIONER

## 2022-01-11 PROCEDURE — 84443 ASSAY THYROID STIM HORMONE: CPT | Performed by: NURSE PRACTITIONER

## 2022-01-11 PROCEDURE — 82306 VITAMIN D 25 HYDROXY: CPT | Performed by: NURSE PRACTITIONER

## 2022-01-11 PROCEDURE — 82607 VITAMIN B-12: CPT | Performed by: NURSE PRACTITIONER

## 2022-01-18 ENCOUNTER — TELEPHONE (OUTPATIENT)
Dept: CARDIOLOGY | Facility: CLINIC | Age: 67
End: 2022-01-18

## 2022-01-18 DIAGNOSIS — D75.1 POLYCYTHEMIA: Primary | ICD-10-CM

## 2022-01-18 NOTE — TELEPHONE ENCOUNTER
Let's go ahead and let Dr. Hodges take a look at him.     I feel the hgb has increased due to smoking but at 19, would get hematology opinion.      Referral placed to Dr. Hodges

## 2022-06-06 ENCOUNTER — TELEPHONE (OUTPATIENT)
Dept: CARDIOLOGY | Facility: CLINIC | Age: 67
End: 2022-06-06

## 2022-06-06 NOTE — TELEPHONE ENCOUNTER
Received fax from Dr. Carbajal for cardiac clearance for patient to have a hernia repair. Patient is on aspirin and Brilinta, unclear if needing to hold. According to our records, patient's last stenting was done on 11/08/19.         Fax [phone]

## 2022-07-25 ENCOUNTER — OFFICE VISIT (OUTPATIENT)
Dept: CARDIOLOGY | Facility: CLINIC | Age: 67
End: 2022-07-25

## 2022-07-25 VITALS
HEART RATE: 68 BPM | DIASTOLIC BLOOD PRESSURE: 62 MMHG | HEIGHT: 70 IN | WEIGHT: 196.4 LBS | BODY MASS INDEX: 28.12 KG/M2 | SYSTOLIC BLOOD PRESSURE: 110 MMHG

## 2022-07-25 DIAGNOSIS — E55.9 VITAMIN D DEFICIENCY: ICD-10-CM

## 2022-07-25 DIAGNOSIS — I25.9 IHD (ISCHEMIC HEART DISEASE): ICD-10-CM

## 2022-07-25 DIAGNOSIS — E78.00 PURE HYPERCHOLESTEROLEMIA: ICD-10-CM

## 2022-07-25 DIAGNOSIS — I10 ESSENTIAL HYPERTENSION: Primary | ICD-10-CM

## 2022-07-25 PROCEDURE — 99214 OFFICE O/P EST MOD 30 MIN: CPT | Performed by: NURSE PRACTITIONER

## 2022-07-25 RX ORDER — LOSARTAN POTASSIUM 100 MG/1
100 TABLET ORAL DAILY
Qty: 90 TABLET | Refills: 3 | Status: SHIPPED | OUTPATIENT
Start: 2022-07-25 | End: 2022-08-09

## 2022-07-25 RX ORDER — ROSUVASTATIN CALCIUM 20 MG/1
20 TABLET, COATED ORAL DAILY
Qty: 90 TABLET | Refills: 3 | Status: SHIPPED | OUTPATIENT
Start: 2022-07-25

## 2022-07-25 RX ORDER — ASPIRIN 81 MG/1
81 TABLET ORAL DAILY
Start: 2022-07-25

## 2022-07-25 NOTE — PROGRESS NOTES
Chief Complaint   Patient presents with   • Follow-up     Cardiac management   • Lab     Last labs about 2 months ago per Dr Hodges. He reports labs now normal.   • Med Refill     Needs refills on cardiac medications-90 day.   • Weight loss     Has been doing intermittent fasting.    • Smoking     Stopped smoking about 2 months ago.     Subjective       Raciel Peter is a 66 y.o. male with HTN, hyperlipidemia with statin intolerance, tobacco use, and IHD.  On 11/4/2019, he developed severe chest tightness/pressure while working. He became very fatigued and weak. He went to the ER at [facility], given NTG paste, symptoms improved.  EKG and troponin negative.  Stress test showed anterior and inferior ischemia.  Cardiac cath revealed a 75-85% stenosis of the LAD with FFR noted at 0.78, single Resolute stent placed to the proximal/mid portion of the LAD.  The diagonal then became jailed and PTCA was done.  At the end of the stent, there was another area of stenosis of around 75-85% and another single Resolute stent was placed.  He was discharged with Brilinta, aspirin, Lipitor.  Later Lipitor changed to Crestor which has been well-tolerated.     Labs 1/11/2022 showed LDL improved from 120 to 93, Creatinine stable at 1.3, H/H increased to 19/57.  He was seen by Dr. Hodges, primary versus secondary polycythemia.  ABG normal according to him.  Advised on smoking cessation.  CBC returned to normal.    He returns today for follow-up visit.  He denies chest pain, shortness of breath, weakness or fatigue.  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  He underwent left inguinal hernia repair in June per Dr. Carbajal without complications.  He has quit smoking.  He has changed diet and has lost more than 20 pounds.       Cardiac History:    Past Surgical History:   Procedure Laterality Date   • CARDIAC CATHETERIZATION  11/08/2019    75-85% LAD. FFR- 0.78. 2.75x26 & 2.5x8 Resolute Stents. PTCA D1   • CARDIOVASCULAR STRESS TEST   2019    @ [facility]. Dr. Aguilar- 4 Min.35 Secs. 85% THR. EF 61%. Anterior & Inferior Ischemia.   • ECHO - CONVERTED  2019    @ [facility]. Dr. Hernandez- EF 60%. RVSP- 32 mmHg.       Current Outpatient Medications   Medication Sig Dispense Refill   • aspirin (aspirin) 81 MG EC tablet Take 1 tablet by mouth Daily.     • losartan (COZAAR) 100 MG tablet Take 1 tablet by mouth Daily. 90 tablet 3   • metoprolol tartrate (LOPRESSOR) 25 MG tablet Take 0.5 tablets by mouth 2 (Two) Times a Day. 90 tablet 3   • nitroglycerin (NITROSTAT) 0.4 MG SL tablet Place 1 tablet under the tongue Every 5 (Five) Minutes As Needed for Chest Pain. Take no more than 3 doses in 15 minutes. Need appt for further refills. 25 tablet 1   • rosuvastatin (CRESTOR) 20 MG tablet Take 1 tablet by mouth Daily. 90 tablet 3   • ticagrelor (Brilinta) 60 MG tablet tablet Take 1 tablet by mouth 2 (Two) Times a Day. 180 tablet 3     No current facility-administered medications for this visit.     Shellfish-derived products and Lipitor [atorvastatin calcium]    Past Medical History:   Diagnosis Date   • History of hernia surgery    • Hx of hernia repair 2022   • Hyperlipidemia    • Hypertension      Social History     Socioeconomic History   • Marital status: Single   Tobacco Use   • Smoking status: Former Smoker     Packs/day: 1.00     Years: 40.00     Pack years: 40.00     Types: Cigarettes     Quit date: 2022     Years since quittin.2   • Smokeless tobacco: Never Used   • Tobacco comment: trying to quit again    Vaping Use   • Vaping Use: Former   • Substances: Nicotine, Flavoring   Substance and Sexual Activity   • Alcohol use: Never   • Drug use: Never   • Sexual activity: Defer     No family history on file.    Review of Systems   Constitutional: Positive for weight loss (-21). Negative for decreased appetite and malaise/fatigue.   HENT: Negative.    Eyes: Negative for blurred vision.   Cardiovascular: Negative for chest pain, dyspnea on  "exertion, leg swelling, palpitations and syncope.   Respiratory: Negative for shortness of breath and sleep disturbances due to breathing.    Endocrine: Negative.    Hematologic/Lymphatic: Negative for bleeding problem. Does not bruise/bleed easily.   Skin: Negative.    Musculoskeletal: Negative for falls and myalgias.   Gastrointestinal: Negative for abdominal pain, heartburn and melena.   Genitourinary: Negative for hematuria.   Neurological: Negative for dizziness and light-headedness.   Psychiatric/Behavioral: Negative for altered mental status.   Allergic/Immunologic: Negative.       Objective     /62 (BP Location: Right arm)   Pulse 68   Ht 177.8 cm (70\")   Wt 89.1 kg (196 lb 6.4 oz)   BMI 28.18 kg/m²     Vitals and nursing note reviewed.   Constitutional:       General: Not in acute distress.     Appearance: Well-developed. Not diaphoretic.   Eyes:      Pupils: Pupils are equal, round, and reactive to light.   HENT:      Head: Normocephalic.   Pulmonary:      Effort: Pulmonary effort is normal. No respiratory distress.      Breath sounds: Normal breath sounds.   Cardiovascular:      Normal rate. Regular rhythm.   Pulses:     Intact distal pulses.   Abdominal:      General: Bowel sounds are normal.      Palpations: Abdomen is soft.   Musculoskeletal: Normal range of motion.      Cervical back: Normal range of motion. Skin:     General: Skin is warm and dry.   Neurological:      Mental Status: Alert and oriented to person, place, and time.        Procedures          Problem List Items Addressed This Visit        Cardiac and Vasculature    IHD (ischemic heart disease)    Relevant Medications    metoprolol tartrate (LOPRESSOR) 25 MG tablet    ticagrelor (Brilinta) 60 MG tablet tablet    Other Relevant Orders    Lipid Panel    CBC (No Diff)    Comprehensive Metabolic Panel    Essential hypertension - Primary    Relevant Medications    losartan (COZAAR) 100 MG tablet    metoprolol tartrate (LOPRESSOR) 25 " MG tablet    Other Relevant Orders    Lipid Panel    CBC (No Diff)    Comprehensive Metabolic Panel    TSH    Pure hypercholesterolemia    Relevant Medications    rosuvastatin (CRESTOR) 20 MG tablet    Other Relevant Orders    Lipid Panel    CBC (No Diff)    Comprehensive Metabolic Panel      Other Visit Diagnoses     Vitamin D deficiency        Relevant Orders    Vitamin D 25 Hydroxy         1. CAD- s/p stenting LAD x2, PTCA diagonal 2019. No anginal symptoms. Continue Brilinta 60 mg, aspirin 81 mg daily.  We discussed repeating stress test but at this time will defer as he is completely asymptomatic.      2. HTN-very well controlled, continue losartan and metoprolol.  He was congratulated on weight loss and smoking cessation.  Limit sodium.        3. Hyperlipidemia-managed with Crestor 20 mg and so far has tolerated well, LDL improved to 93.  We will repeat lipids, LFT.     4.  Tobacco use-in remission    Further recommendations to follow labs, refill sent.  No changes made.  Follow-up in 6 months or sooner if needed.     BMI is >= 25 and <30. (Overweight) The following options were offered after discussion;: nutrition counseling/recommendations               Electronically signed by PHILIPP Dunbar,  July 25, 2022 11:12 EDT

## 2022-07-26 DIAGNOSIS — I25.9 IHD (ISCHEMIC HEART DISEASE): Primary | ICD-10-CM

## 2022-07-26 DIAGNOSIS — R07.89 CHEST PAIN, ATYPICAL: ICD-10-CM

## 2022-07-26 RX ORDER — NITROGLYCERIN 0.4 MG/1
0.4 TABLET SUBLINGUAL
Qty: 25 TABLET | Refills: 11 | Status: SHIPPED | OUTPATIENT
Start: 2022-07-26

## 2022-08-09 RX ORDER — LOSARTAN POTASSIUM 100 MG/1
100 TABLET ORAL DAILY
Qty: 90 TABLET | Refills: 1 | Status: SHIPPED | OUTPATIENT
Start: 2022-08-09 | End: 2023-02-02 | Stop reason: SDUPTHER

## 2022-09-23 RX ORDER — EZETIMIBE 10 MG/1
10 TABLET ORAL DAILY
Qty: 90 TABLET | Refills: 3 | Status: SHIPPED | OUTPATIENT
Start: 2022-09-23

## 2023-02-02 ENCOUNTER — OFFICE VISIT (OUTPATIENT)
Dept: CARDIOLOGY | Facility: CLINIC | Age: 68
End: 2023-02-02
Payer: MEDICARE

## 2023-02-02 VITALS
HEIGHT: 70 IN | SYSTOLIC BLOOD PRESSURE: 120 MMHG | BODY MASS INDEX: 31.84 KG/M2 | HEART RATE: 72 BPM | DIASTOLIC BLOOD PRESSURE: 72 MMHG | WEIGHT: 222.4 LBS

## 2023-02-02 DIAGNOSIS — D75.1 POLYCYTHEMIA: ICD-10-CM

## 2023-02-02 DIAGNOSIS — I25.9 IHD (ISCHEMIC HEART DISEASE): ICD-10-CM

## 2023-02-02 DIAGNOSIS — I10 ESSENTIAL HYPERTENSION: Primary | ICD-10-CM

## 2023-02-02 DIAGNOSIS — E78.00 PURE HYPERCHOLESTEROLEMIA: ICD-10-CM

## 2023-02-02 PROCEDURE — 99213 OFFICE O/P EST LOW 20 MIN: CPT | Performed by: NURSE PRACTITIONER

## 2023-02-02 RX ORDER — LOSARTAN POTASSIUM 100 MG/1
100 TABLET ORAL DAILY
Qty: 90 TABLET | Refills: 3 | Status: SHIPPED | OUTPATIENT
Start: 2023-02-02

## 2023-02-02 NOTE — PROGRESS NOTES
Chief Complaint   Patient presents with   • Follow-up     Cardiac management   • Lab     Last labs in chart.   • Med Refill     Needs refills on Losartan-90 day. Patient went over medications verbally.     Subjective       Raciel Peter is a 67 y.o. male with HTN, hyperlipidemia with statin intolerance, tobacco use, and IHD.  On 11/4/2019, he developed severe chest tightness/pressure while working. He became very fatigued and weak. He went to the ER at [facility], given NTG paste, symptoms improved.  EKG and troponin negative.  Stress test showed anterior and inferior ischemia.  Cardiac cath revealed a 75-85% stenosis of the LAD with FFR noted at 0.78, single Resolute stent placed to the proximal/mid portion of the LAD.  The diagonal then became jailed and PTCA was done.  At the end of the stent, there was another area of stenosis of around 75-85% and another single Resolute stent was placed.  He was discharged with Brilinta, aspirin, Lipitor. Later, Lipitor changed to Crestor which has been well-tolerated. Labs 1/11/2022 showed LDL improved from 120 to 93, Creatinine stable at 1.3, H/H increased to 19/57.  He was seen by Dr. Hodges, primary versus secondary polycythemia.  ABG normal according to him.  Advised on smoking cessation and he was able to quit. CBC returned to normal.     He returns today for follow-up visit. He denies chest pain, shortness of breath, weakness or fatigue. He has gained a little weight since smoking cessation and inguinal hernia repair. Labs 9/2022, LDL 99, HDL 44, Tri 119. Zetia added to Crestor.          Cardiac History:    Past Surgical History:   Procedure Laterality Date   • CARDIAC CATHETERIZATION  11/08/2019    75-85% LAD. FFR- 0.78. 2.75x26 & 2.5x8 Resolute Stents. PTCA D1   • CARDIOVASCULAR STRESS TEST  11/05/2019    @ [facility]. Dr. Aguilar- 4 Min.35 Secs. 85% THR. EF 61%. Anterior & Inferior Ischemia.   • ECHO - CONVERTED  11/04/2019    @ [facility]. Dr. Hernandez- EF 60%. RVSP- 32 mmHg.      Current Outpatient Medications   Medication Sig Dispense Refill   • aspirin (aspirin) 81 MG EC tablet Take 1 tablet by mouth Daily.     • ezetimibe (ZETIA) 10 MG tablet Take 1 tablet by mouth Daily. 90 tablet 3   • losartan (COZAAR) 100 MG tablet Take 1 tablet by mouth Daily. 90 tablet 3   • metoprolol tartrate (LOPRESSOR) 25 MG tablet Take 0.5 tablets by mouth 2 (Two) Times a Day. 90 tablet 3   • nitroglycerin (NITROSTAT) 0.4 MG SL tablet Place 1 tablet under the tongue Every 5 (Five) Minutes As Needed for Chest Pain. Take no more than 3 doses in 15 minutes. Need appt for further refills. 25 tablet 11   • rosuvastatin (CRESTOR) 20 MG tablet Take 1 tablet by mouth Daily. 90 tablet 3   • ticagrelor (Brilinta) 60 MG tablet tablet Take 1 tablet by mouth 2 (Two) Times a Day. 180 tablet 3     No current facility-administered medications for this visit.     Shellfish-derived products and Lipitor [atorvastatin calcium]    Past Medical History:   Diagnosis Date   • History of hernia surgery    • Hx of hernia repair 2022   • Hyperlipidemia    • Hypertension      Social History     Socioeconomic History   • Marital status: Single   Tobacco Use   • Smoking status: Former     Packs/day: 1.00     Years: 40.00     Pack years: 40.00     Types: Cigarettes     Quit date: 2022     Years since quittin.7   • Smokeless tobacco: Never   • Tobacco comments:     trying to quit again    Vaping Use   • Vaping Use: Some days   • Substances: Nicotine, Flavoring   • Devices: Disposable   Substance and Sexual Activity   • Alcohol use: Never   • Drug use: Never   • Sexual activity: Defer     History reviewed. No pertinent family history.    Review of Systems   Constitutional: Positive for weight gain. Negative for decreased appetite and malaise/fatigue.   HENT: Negative.    Eyes: Negative for blurred vision.   Cardiovascular: Negative for chest pain, dyspnea on exertion, leg swelling, palpitations and syncope.   Respiratory:  "Negative for shortness of breath and sleep disturbances due to breathing.    Endocrine: Negative.    Hematologic/Lymphatic: Negative for bleeding problem. Does not bruise/bleed easily.   Skin: Negative.    Musculoskeletal: Negative for falls and myalgias.   Gastrointestinal: Negative for abdominal pain, heartburn and melena.   Genitourinary: Negative for hematuria.   Neurological: Negative for dizziness and light-headedness.   Psychiatric/Behavioral: Negative for altered mental status.   Allergic/Immunologic: Negative.       Objective     /72 (BP Location: Right arm)   Pulse 72   Ht 177.8 cm (70\")   Wt 101 kg (222 lb 6.4 oz)   BMI 31.91 kg/m²     Vitals and nursing note reviewed.   Constitutional:       General: Not in acute distress.     Appearance: Well-developed. Not diaphoretic.   Eyes:      Pupils: Pupils are equal, round, and reactive to light.   HENT:      Head: Normocephalic.   Pulmonary:      Effort: Pulmonary effort is normal. No respiratory distress.      Breath sounds: Normal breath sounds.   Cardiovascular:      Normal rate. Regular rhythm.   Pulses:     Intact distal pulses.   Abdominal:      General: Bowel sounds are normal.      Palpations: Abdomen is soft.   Musculoskeletal: Normal range of motion.      Cervical back: Normal range of motion. Skin:     General: Skin is warm and dry.   Neurological:      Mental Status: Alert and oriented to person, place, and time.        Procedures          Problem List Items Addressed This Visit        Cardiac and Vasculature    IHD (ischemic heart disease)    Relevant Orders    Comprehensive Metabolic Panel    CBC & Differential    Lipid Panel    TSH    Essential hypertension - Primary    Relevant Medications    losartan (COZAAR) 100 MG tablet    Other Relevant Orders    Comprehensive Metabolic Panel    CBC & Differential    Lipid Panel    TSH    Pure hypercholesterolemia    Relevant Orders    Comprehensive Metabolic Panel    CBC & Differential    Lipid " Panel    TSH   Other Visit Diagnoses     Polycythemia        Relevant Orders    Comprehensive Metabolic Panel    CBC & Differential    Lipid Panel    TSH         1. CAD- s/p stenting LAD x 2, PTCA diagonal 2019. No anginal symptoms. Continue Brilinta 60 mg, aspirin 81 mg daily.  Continue to observe as he remains asymptomatic.      2. HTN-very well controlled, continue losartan and metoprolol.  Maintains smoking cessation.  Limit sodium.        3. Hyperlipidemia-managed with Crestor 20 mg and Zetia 10 mg added 9/2022. Recheck lipids. Order given.       4.  Tobacco use- remains in remission     No changes made.  Follow-up in 6 months or sooner if needed.    BMI is >= 30 and <35. (Class 1 Obesity). The following options were offered after discussion;: nutrition counseling/recommendations            Electronically signed by PHILIPP Dunbar,  February 4, 2023 14:33 EST

## 2023-07-31 RX ORDER — TICAGRELOR 60 MG/1
TABLET ORAL
Qty: 180 TABLET | Refills: 3 | Status: SHIPPED | OUTPATIENT
Start: 2023-07-31

## 2023-08-10 ENCOUNTER — OFFICE VISIT (OUTPATIENT)
Dept: CARDIOLOGY | Facility: CLINIC | Age: 68
End: 2023-08-10
Payer: MEDICARE

## 2023-08-10 VITALS
HEART RATE: 68 BPM | SYSTOLIC BLOOD PRESSURE: 110 MMHG | DIASTOLIC BLOOD PRESSURE: 76 MMHG | WEIGHT: 202 LBS | HEIGHT: 70 IN | BODY MASS INDEX: 28.92 KG/M2

## 2023-08-10 DIAGNOSIS — E78.00 PURE HYPERCHOLESTEROLEMIA: ICD-10-CM

## 2023-08-10 DIAGNOSIS — I10 ESSENTIAL HYPERTENSION: ICD-10-CM

## 2023-08-10 DIAGNOSIS — I25.9 IHD (ISCHEMIC HEART DISEASE): Primary | ICD-10-CM

## 2023-08-10 PROCEDURE — 3078F DIAST BP <80 MM HG: CPT | Performed by: NURSE PRACTITIONER

## 2023-08-10 PROCEDURE — 99213 OFFICE O/P EST LOW 20 MIN: CPT | Performed by: NURSE PRACTITIONER

## 2023-08-10 PROCEDURE — 1160F RVW MEDS BY RX/DR IN RCRD: CPT | Performed by: NURSE PRACTITIONER

## 2023-08-10 PROCEDURE — 3074F SYST BP LT 130 MM HG: CPT | Performed by: NURSE PRACTITIONER

## 2023-08-10 PROCEDURE — 1159F MED LIST DOCD IN RCRD: CPT | Performed by: NURSE PRACTITIONER

## 2023-08-10 RX ORDER — EZETIMIBE 10 MG/1
10 TABLET ORAL DAILY
Qty: 90 TABLET | Refills: 3 | Status: SHIPPED | OUTPATIENT
Start: 2023-08-10

## 2023-08-10 RX ORDER — ROSUVASTATIN CALCIUM 20 MG/1
20 TABLET, COATED ORAL DAILY
Qty: 90 TABLET | Refills: 3 | Status: SHIPPED | OUTPATIENT
Start: 2023-08-10

## 2023-08-10 RX ORDER — LOSARTAN POTASSIUM 100 MG/1
100 TABLET ORAL DAILY
Qty: 90 TABLET | Refills: 3 | Status: SHIPPED | OUTPATIENT
Start: 2023-08-10

## 2023-08-10 NOTE — LETTER
August 10, 2023       No Recipients    Patient: Raciel Peter   YOB: 1955   Date of Visit: 8/10/2023       Dear PHILIPP Duvall    Raciel Peter was in my office today. Below is a copy of my note.    If you have questions, please do not hesitate to call me. I look forward to following Raciel along with you.         Sincerely,        PHILIPP Dunbar        CC:   No Recipients    Chief Complaint   Patient presents with    Follow-up     Cardiac management    Lab     Last labs in chart.    Med Refill     Needs refills on cardiac medications except Brilinta-90 day.    Weight loss     Watching diet closely and walking.     Subjective      Raciel Peter is a 67 y.o. male with HTN, hyperlipidemia, and IHD.  On 11/4/2019, he developed severe chest tightness, presented to [facility] ER, given NTG paste, symptoms improved.  EKG and troponin negative.  Stress test showed anterior and inferior ischemia.  Cardiac cath revealed a 75-85% stenosis of the LAD with FFR noted at 0.78, single Resolute stent placed to the proximal/mid portion of the LAD.  The diagonal then became jailed and PTCA was done.  At the end of the stent, there was another area of stenosis of around 75-85% and another single Resolute stent was placed.  He was discharged with Brilinta, aspirin, Lipitor. Later, Lipitor changed to Crestor which has been well-tolerated. Labs 1/11/2022 showed LDL improved from 120 to 93, Creatinine stable at 1.3, H/H increased to 19/57.  He was seen by Dr. Hodges, primary versus secondary polycythemia.  ABG normal according to him.  Advised on smoking cessation and he was able to quit. CBC returned to normal.      He returns today for follow-up visit. He denies chest pain, shortness of breath, weakness or fatigue. He changed diet in May, has lost >20 lbs. He feels well, no difficulty performing tasks. He maintains smoking cessation. After adding Zetia to Crestor, lipids improved.  On 2/13/2023:  H/H18.5/54.3, platelets 212, BUNs/CR 18/1.38, normal electrolytes, normal LFT, GFR 56, A1c 5.6, , , HDL 40, LDL 63, normal B12 and folate, vitamin D 32, TSH 1.9.         Cardiac History:    Past Surgical History:   Procedure Laterality Date    CARDIAC CATHETERIZATION  11/08/2019    75-85% LAD. FFR- 0.78. 2.75x26 & 2.5x8 Resolute Stents. PTCA D1    CARDIOVASCULAR STRESS TEST  11/05/2019    @ [facility]. Dr. Aguilar- 4 Min.35 Secs. 85% THR. EF 61%. Anterior & Inferior Ischemia.    ECHO - CONVERTED  11/04/2019    @ [facility]. Dr. Hernandez- EF 60%. RVSP- 32 mmHg.     Current Outpatient Medications   Medication Sig Dispense Refill    aspirin (aspirin) 81 MG EC tablet Take 1 tablet by mouth Daily.      Brilinta 60 MG tablet tablet TAKE ONE TABLET BY MOUTH TWICE DAILY TO INHIBIT CLOTTING 180 tablet 3    Cyanocobalamin (VITAMIN B12 PO) Take  by mouth Daily.      ezetimibe (ZETIA) 10 MG tablet Take 1 tablet by mouth Daily. 90 tablet 3    losartan (COZAAR) 100 MG tablet Take 1 tablet by mouth Daily. 90 tablet 3    metoprolol tartrate (LOPRESSOR) 25 MG tablet Take 0.5 tablets by mouth 2 (Two) Times a Day. 90 tablet 3    Neomycin-Polymyxin-Dexameth 0.1 % ointment Apply  to eye(s) as directed by provider Daily As Needed.      nitroglycerin (NITROSTAT) 0.4 MG SL tablet Place 1 tablet under the tongue Every 5 (Five) Minutes As Needed for Chest Pain. Take no more than 3 doses in 15 minutes. Need appt for further refills. 25 tablet 11    rosuvastatin (CRESTOR) 20 MG tablet Take 1 tablet by mouth Daily. 90 tablet 3    VITAMIN D PO Take  by mouth Daily.       No current facility-administered medications for this visit.     Shellfish-derived products and Lipitor [atorvastatin calcium]    Past Medical History:   Diagnosis Date    History of hernia surgery     Hx of hernia repair 06/17/2022    Hyperlipidemia     Hypertension      Social History     Socioeconomic History    Marital status: Single   Tobacco Use     "Smoking status: Former     Packs/day: 1.00     Years: 40.00     Pack years: 40.00     Types: Cigarettes     Quit date: 2022     Years since quittin.2    Smokeless tobacco: Never    Tobacco comments:     trying to quit again    Vaping Use    Vaping Use: Some days    Substances: Nicotine, Flavoring    Devices: Disposable   Substance and Sexual Activity    Alcohol use: Never    Drug use: Never    Sexual activity: Defer     History reviewed. No pertinent family history.    Review of Systems   Constitutional: Positive for weight loss (<20). Negative for decreased appetite and malaise/fatigue.   HENT: Negative.     Eyes:  Negative for blurred vision.   Cardiovascular:  Negative for chest pain, dyspnea on exertion, leg swelling, palpitations and syncope.   Respiratory:  Negative for shortness of breath and sleep disturbances due to breathing.    Endocrine: Negative.    Hematologic/Lymphatic: Negative for bleeding problem. Does not bruise/bleed easily.   Skin: Negative.    Musculoskeletal:  Negative for falls and myalgias.   Gastrointestinal:  Negative for abdominal pain, heartburn and melena.   Genitourinary:  Negative for hematuria.   Neurological:  Negative for dizziness and light-headedness.   Psychiatric/Behavioral:  Negative for altered mental status.    Allergic/Immunologic: Negative.       Objective    /76 (BP Location: Left arm)   Pulse 68   Ht 177.8 cm (70\")   Wt 91.6 kg (202 lb)   BMI 28.98 kg/mý     Vitals and nursing note reviewed.   Constitutional:       General: Not in acute distress.     Appearance: Well-developed. Not diaphoretic.   Eyes:      Pupils: Pupils are equal, round, and reactive to light.   HENT:      Head: Normocephalic.   Pulmonary:      Effort: Pulmonary effort is normal. No respiratory distress.      Breath sounds: Normal breath sounds.   Cardiovascular:      Normal rate. Regular rhythm.   Pulses:     Intact distal pulses.   Edema:     Peripheral edema absent. "   Abdominal:      General: Bowel sounds are normal.      Palpations: Abdomen is soft.   Musculoskeletal: Normal range of motion.      Cervical back: Normal range of motion. Skin:     General: Skin is warm and dry.   Neurological:      Mental Status: Alert and oriented to person, place, and time.      Procedures          Problem List Items Addressed This Visit          Cardiac and Vasculature    IHD (ischemic heart disease) - Primary    Relevant Medications    metoprolol tartrate (LOPRESSOR) 25 MG tablet    Essential hypertension    Relevant Medications    losartan (COZAAR) 100 MG tablet    metoprolol tartrate (LOPRESSOR) 25 MG tablet    Pure hypercholesterolemia    Relevant Medications    rosuvastatin (CRESTOR) 20 MG tablet    ezetimibe (ZETIA) 10 MG tablet      CAD  -s/p stenting LAD x 2, PTCA diagonal, 2019  -continue Brilinta 60 mg BID, aspirin 81 mg    HTN  -well controlled  -continue losartan, metoprolol     Hyperlipidemia  -excellent LDL control at 63 after adding Zetia  -continue Crestor, Zetia     Labs at PCP office. Refills sent. Could we get next copy?             Electronically signed by PHILIPP Dunbar,  August 10, 2023 12:21 EDT

## 2023-08-10 NOTE — PROGRESS NOTES
Chief Complaint   Patient presents with    Follow-up     Cardiac management    Lab     Last labs in chart.    Med Refill     Needs refills on cardiac medications except Brilinta-90 day.    Weight loss     Watching diet closely and walking.     Subjective       Raciel Peter is a 67 y.o. male with HTN, hyperlipidemia, and IHD.  On 11/4/2019, he developed severe chest tightness, presented to [facility] ER, given NTG paste, symptoms improved.  EKG and troponin negative.  Stress test showed anterior and inferior ischemia.  Cardiac cath revealed a 75-85% stenosis of the LAD with FFR noted at 0.78, single Resolute stent placed to the proximal/mid portion of the LAD.  The diagonal then became jailed and PTCA was done.  At the end of the stent, there was another area of stenosis of around 75-85% and another single Resolute stent was placed.  He was discharged with Brilinta, aspirin, Lipitor. Later, Lipitor changed to Crestor which has been well-tolerated. Labs 1/11/2022 showed LDL improved from 120 to 93, Creatinine stable at 1.3, H/H increased to 19/57.  He was seen by Dr. Hodges, primary versus secondary polycythemia.  ABG normal according to him.  Advised on smoking cessation and he was able to quit. CBC returned to normal.      He returns today for follow-up visit. He denies chest pain, shortness of breath, weakness or fatigue. He changed diet in May, has lost >20 lbs. He feels well, no difficulty performing tasks. He maintains smoking cessation. After adding Zetia to Crestor, lipids improved.  On 2/13/2023: H/H18.5/54.3, platelets 212, BUNs/CR 18/1.38, normal electrolytes, normal LFT, GFR 56, A1c 5.6, , , HDL 40, LDL 63, normal B12 and folate, vitamin D 32, TSH 1.9.         Cardiac History:    Past Surgical History:   Procedure Laterality Date    CARDIAC CATHETERIZATION  11/08/2019    75-85% LAD. FFR- 0.78. 2.75x26 & 2.5x8 Resolute Stents. PTCA D1    CARDIOVASCULAR STRESS TEST  11/05/2019    @ [facility].   Jeff- 4 Min.35 Secs. 85% THR. EF 61%. Anterior & Inferior Ischemia.    ECHO - CONVERTED  2019    @ [facility]. Dr. Hernandez- EF 60%. RVSP- 32 mmHg.     Current Outpatient Medications   Medication Sig Dispense Refill    aspirin (aspirin) 81 MG EC tablet Take 1 tablet by mouth Daily.      Brilinta 60 MG tablet tablet TAKE ONE TABLET BY MOUTH TWICE DAILY TO INHIBIT CLOTTING 180 tablet 3    Cyanocobalamin (VITAMIN B12 PO) Take  by mouth Daily.      ezetimibe (ZETIA) 10 MG tablet Take 1 tablet by mouth Daily. 90 tablet 3    losartan (COZAAR) 100 MG tablet Take 1 tablet by mouth Daily. 90 tablet 3    metoprolol tartrate (LOPRESSOR) 25 MG tablet Take 0.5 tablets by mouth 2 (Two) Times a Day. 90 tablet 3    Neomycin-Polymyxin-Dexameth 0.1 % ointment Apply  to eye(s) as directed by provider Daily As Needed.      nitroglycerin (NITROSTAT) 0.4 MG SL tablet Place 1 tablet under the tongue Every 5 (Five) Minutes As Needed for Chest Pain. Take no more than 3 doses in 15 minutes. Need appt for further refills. 25 tablet 11    rosuvastatin (CRESTOR) 20 MG tablet Take 1 tablet by mouth Daily. 90 tablet 3    VITAMIN D PO Take  by mouth Daily.       No current facility-administered medications for this visit.     Shellfish-derived products and Lipitor [atorvastatin calcium]    Past Medical History:   Diagnosis Date    History of hernia surgery     Hx of hernia repair 2022    Hyperlipidemia     Hypertension      Social History     Socioeconomic History    Marital status: Single   Tobacco Use    Smoking status: Former     Packs/day: 1.00     Years: 40.00     Pack years: 40.00     Types: Cigarettes     Quit date: 2022     Years since quittin.2    Smokeless tobacco: Never    Tobacco comments:     trying to quit again    Vaping Use    Vaping Use: Some days    Substances: Nicotine, Flavoring    Devices: Disposable   Substance and Sexual Activity    Alcohol use: Never    Drug use: Never    Sexual activity: Defer     History  "reviewed. No pertinent family history.    Review of Systems   Constitutional: Positive for weight loss (<20). Negative for decreased appetite and malaise/fatigue.   HENT: Negative.     Eyes:  Negative for blurred vision.   Cardiovascular:  Negative for chest pain, dyspnea on exertion, leg swelling, palpitations and syncope.   Respiratory:  Negative for shortness of breath and sleep disturbances due to breathing.    Endocrine: Negative.    Hematologic/Lymphatic: Negative for bleeding problem. Does not bruise/bleed easily.   Skin: Negative.    Musculoskeletal:  Negative for falls and myalgias.   Gastrointestinal:  Negative for abdominal pain, heartburn and melena.   Genitourinary:  Negative for hematuria.   Neurological:  Negative for dizziness and light-headedness.   Psychiatric/Behavioral:  Negative for altered mental status.    Allergic/Immunologic: Negative.       Objective     /76 (BP Location: Left arm)   Pulse 68   Ht 177.8 cm (70\")   Wt 91.6 kg (202 lb)   BMI 28.98 kg/mý     Vitals and nursing note reviewed.   Constitutional:       General: Not in acute distress.     Appearance: Well-developed. Not diaphoretic.   Eyes:      Pupils: Pupils are equal, round, and reactive to light.   HENT:      Head: Normocephalic.   Pulmonary:      Effort: Pulmonary effort is normal. No respiratory distress.      Breath sounds: Normal breath sounds.   Cardiovascular:      Normal rate. Regular rhythm.   Pulses:     Intact distal pulses.   Edema:     Peripheral edema absent.   Abdominal:      General: Bowel sounds are normal.      Palpations: Abdomen is soft.   Musculoskeletal: Normal range of motion.      Cervical back: Normal range of motion. Skin:     General: Skin is warm and dry.   Neurological:      Mental Status: Alert and oriented to person, place, and time.      Procedures          Problem List Items Addressed This Visit          Cardiac and Vasculature    IHD (ischemic heart disease) - Primary    Relevant " Medications    metoprolol tartrate (LOPRESSOR) 25 MG tablet    Essential hypertension    Relevant Medications    losartan (COZAAR) 100 MG tablet    metoprolol tartrate (LOPRESSOR) 25 MG tablet    Pure hypercholesterolemia    Relevant Medications    rosuvastatin (CRESTOR) 20 MG tablet    ezetimibe (ZETIA) 10 MG tablet      CAD  -s/p stenting LAD x 2, PTCA diagonal, 2019  -continue Brilinta 60 mg BID, aspirin 81 mg    HTN  -well controlled  -continue losartan, metoprolol     Hyperlipidemia  -excellent LDL control at 63 after adding Zetia  -continue Crestor, Zetia     Labs at PCP office. Refills sent. Could we get next copy?             Electronically signed by PHILIPP Dunbar,  August 10, 2023 12:21 EDT

## 2024-02-27 ENCOUNTER — OFFICE VISIT (OUTPATIENT)
Dept: CARDIOLOGY | Facility: CLINIC | Age: 69
End: 2024-02-27
Payer: MEDICARE

## 2024-02-27 VITALS
DIASTOLIC BLOOD PRESSURE: 72 MMHG | SYSTOLIC BLOOD PRESSURE: 120 MMHG | WEIGHT: 219.2 LBS | HEART RATE: 72 BPM | HEIGHT: 70 IN | BODY MASS INDEX: 31.38 KG/M2

## 2024-02-27 DIAGNOSIS — I25.9 IHD (ISCHEMIC HEART DISEASE): Primary | ICD-10-CM

## 2024-02-27 DIAGNOSIS — I10 ESSENTIAL HYPERTENSION: ICD-10-CM

## 2024-02-27 DIAGNOSIS — E78.00 PURE HYPERCHOLESTEROLEMIA: ICD-10-CM

## 2024-02-27 PROCEDURE — 3078F DIAST BP <80 MM HG: CPT | Performed by: NURSE PRACTITIONER

## 2024-02-27 PROCEDURE — 1160F RVW MEDS BY RX/DR IN RCRD: CPT | Performed by: NURSE PRACTITIONER

## 2024-02-27 PROCEDURE — 1159F MED LIST DOCD IN RCRD: CPT | Performed by: NURSE PRACTITIONER

## 2024-02-27 PROCEDURE — 99213 OFFICE O/P EST LOW 20 MIN: CPT | Performed by: NURSE PRACTITIONER

## 2024-02-27 PROCEDURE — 3074F SYST BP LT 130 MM HG: CPT | Performed by: NURSE PRACTITIONER

## 2024-02-27 RX ORDER — LOSARTAN POTASSIUM 100 MG/1
100 TABLET ORAL DAILY
Qty: 90 TABLET | Refills: 3 | Status: SHIPPED | OUTPATIENT
Start: 2024-02-27

## 2024-02-27 RX ORDER — ROSUVASTATIN CALCIUM 20 MG/1
20 TABLET, COATED ORAL DAILY
Qty: 90 TABLET | Refills: 3 | Status: SHIPPED | OUTPATIENT
Start: 2024-02-27

## 2024-02-27 RX ORDER — EZETIMIBE 10 MG/1
10 TABLET ORAL DAILY
Qty: 90 TABLET | Refills: 3 | Status: SHIPPED | OUTPATIENT
Start: 2024-02-27

## 2024-02-27 NOTE — PROGRESS NOTES
Chief Complaint   Patient presents with    Follow-up     Cardiac management    Lab     Last labs a week ago.    Med Refill     Needs refills on cardiac medications-90 day     Subjective       Raciel Peter is a 68 y.o. male with HTN, hyperlipidemia, and IHD.  On 11/4/2019, he developed severe chest tightness, presented to [facility] ER, given NTG paste, symptoms improved.  EKG and troponin negative.  Stress test showed anterior and inferior ischemia.  Cardiac cath revealed a 75-85% stenosis of the LAD with FFR noted at 0.78, single Resolute stent placed to the proximal/mid portion of the LAD.  The diagonal then became jailed and PTCA was done.  At the end of the stent, there was another area of stenosis of around 75-85% and another single Resolute stent was placed.  He was discharged with Brilinta, aspirin, Lipitor. Later, Lipitor changed to Crestor which has been well-tolerated. H/H increased to 19/57.  He was seen by Dr. Hodges, primary versus secondary polycythemia.  ABG normal according to him.  Advised on smoking cessation and he was able to quit. CBC returned to normal.      He returns today for follow-up visit. He denies chest pain, shortness of breath, weakness or fatigue. 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 Maintains smoking cessation. Lipids on 2/20/24 with PCP showed excellent lipid control. , TRI 95, HDL 41, LDL 48. 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  Vitamin D 32.9. Glucose 129, BUN/CR 18/1.3, GFR 58, normal electrolytes, TSH 2.09.       Cardiac History:    Past Surgical History:   Procedure Laterality Date    CARDIAC CATHETERIZATION  11/08/2019    75-85% LAD. FFR- 0.78. 2.75x26 & 2.5x8 Resolute Stents. PTCA D1    CARDIOVASCULAR STRESS TEST  11/05/2019    @ [facility]. Dr. Aguilar- 4 Min.35 Secs. 85% THR. EF 61%. Anterior & Inferior Ischemia.    ECHO - CONVERTED  11/04/2019    @ [facility]. Dr. Hernandez-  EF 60%. RVSP- 32 mmHg.     Current Outpatient Medications   Medication Sig Dispense Refill    aspirin (aspirin) 81 MG EC tablet Take 1 tablet by mouth Daily.      Cyanocobalamin (VITAMIN B12 PO) Take  by mouth Daily.      ezetimibe (ZETIA) 10 MG tablet Take 1 tablet by mouth Daily. 90 tablet 3    losartan (COZAAR) 100 MG tablet Take 1 tablet by mouth Daily. 90 tablet 3    metoprolol tartrate (LOPRESSOR) 25 MG tablet Take 0.5 tablets by mouth 2 (Two) Times a Day. 90 tablet 3    Neomycin-Polymyxin-Dexameth 0.1 % ointment Apply  to eye(s) as directed by provider Daily As Needed.      nitroglycerin (NITROSTAT) 0.4 MG SL tablet Place 1 tablet under the tongue Every 5 (Five) Minutes As Needed for Chest Pain. Take no more than 3 doses in 15 minutes. Need appt for further refills. 25 tablet 11    rosuvastatin (CRESTOR) 20 MG tablet Take 1 tablet by mouth Daily. 90 tablet 3    ticagrelor (Brilinta) 60 MG tablet tablet Take 1 tablet by mouth 2 (Two) Times a Day. 180 tablet 3    VITAMIN D PO Take  by mouth Daily.       No current facility-administered medications for this visit.     Shellfish-derived products and Lipitor [atorvastatin calcium]    Past Medical History:   Diagnosis Date    History of hernia surgery     Hx of hernia repair 2022    Hyperlipidemia     Hypertension      Social History     Socioeconomic History    Marital status: Single   Tobacco Use    Smoking status: Former     Packs/day: 1.00     Years: 40.00     Additional pack years: 0.00     Total pack years: 40.00     Types: Cigarettes     Quit date: 2022     Years since quittin.8     Passive exposure: Past    Smokeless tobacco: Never    Tobacco comments:     trying to quit again    Vaping Use    Vaping Use: Some days    Substances: Nicotine, Flavoring    Devices: Disposable   Substance and Sexual Activity    Alcohol use: Never    Drug use: Never    Sexual activity: Defer     History reviewed. No pertinent family history.    Review of Systems  "  Constitutional: Positive for weight gain. Negative for decreased appetite and malaise/fatigue.   HENT: Negative.     Eyes:  Negative for blurred vision.   Cardiovascular:  Negative for chest pain, dyspnea on exertion, leg swelling, palpitations and syncope.   Respiratory:  Negative for shortness of breath and sleep disturbances due to breathing.    Endocrine: Negative.    Hematologic/Lymphatic: Negative for bleeding problem. Does not bruise/bleed easily.   Skin: Negative.    Musculoskeletal:  Negative for falls and myalgias.   Gastrointestinal:  Negative for abdominal pain, heartburn and melena.   Genitourinary:  Negative for hematuria.   Neurological:  Negative for dizziness and light-headedness.   Psychiatric/Behavioral:  Negative for altered mental status.    Allergic/Immunologic: Negative.       Objective     /72 (BP Location: Left arm)   Pulse 72   Ht 177.8 cm (70\")   Wt 99.4 kg (219 lb 3.2 oz)   BMI 31.45 kg/m²     Vitals and nursing note reviewed.   Constitutional:       General: Not in acute distress.     Appearance: Well-developed. Not diaphoretic.   Eyes:      Pupils: Pupils are equal, round, and reactive to light.   HENT:      Head: Normocephalic.   Pulmonary:      Effort: Pulmonary effort is normal. No respiratory distress.      Breath sounds: Normal breath sounds.   Cardiovascular:      Normal rate. Regular rhythm.   Pulses:     Intact distal pulses.   Edema:     Peripheral edema absent.   Abdominal:      General: Bowel sounds are normal.      Palpations: Abdomen is soft.   Musculoskeletal: Normal range of motion.      Cervical back: Normal range of motion. Skin:     General: Skin is warm and dry.   Neurological:      Mental Status: Alert and oriented to person, place, and time.        Procedures          Problem List Items Addressed This Visit          Cardiac and Vasculature    IHD (ischemic heart disease) - Primary    Relevant Medications    metoprolol tartrate (LOPRESSOR) 25 MG tablet    " ticagrelor (Brilinta) 60 MG tablet tablet    Essential hypertension    Relevant Medications    losartan (COZAAR) 100 MG tablet    metoprolol tartrate (LOPRESSOR) 25 MG tablet    Pure hypercholesterolemia    Relevant Medications    rosuvastatin (CRESTOR) 20 MG tablet    ezetimibe (ZETIA) 10 MG tablet      CAD  -s/p stenting LAD x 2, PTCA diagonal, 2019  -continue Brilinta 60 mg BID, aspirin 81 mg  -no anginal symptoms  -will repeat stress test in the next year or sooner if new symptoms develop     HTN  -well controlled  -continue losartan, metoprolol      Hyperlipidemia  -excellent LDL control at 48  -continue Crestor, Zetia     No changes made. Refills sent. FU 6 months.     BMI is >= 25 and <30. (Overweight) The following options were offered after discussion;: nutrition counseling/recommendations               Electronically signed by PHILIPP Dunbar,  February 27, 2024 08:41 EST

## 2024-09-03 ENCOUNTER — OFFICE VISIT (OUTPATIENT)
Dept: CARDIOLOGY | Facility: CLINIC | Age: 69
End: 2024-09-03
Payer: MEDICARE

## 2024-09-03 VITALS
DIASTOLIC BLOOD PRESSURE: 80 MMHG | HEART RATE: 60 BPM | SYSTOLIC BLOOD PRESSURE: 140 MMHG | HEIGHT: 70 IN | BODY MASS INDEX: 30.9 KG/M2 | WEIGHT: 215.8 LBS

## 2024-09-03 DIAGNOSIS — R42 VERTIGO: ICD-10-CM

## 2024-09-03 DIAGNOSIS — I25.9 IHD (ISCHEMIC HEART DISEASE): Primary | ICD-10-CM

## 2024-09-03 DIAGNOSIS — I10 ESSENTIAL HYPERTENSION: ICD-10-CM

## 2024-09-03 DIAGNOSIS — E78.00 PURE HYPERCHOLESTEROLEMIA: ICD-10-CM

## 2024-09-03 PROCEDURE — 1160F RVW MEDS BY RX/DR IN RCRD: CPT | Performed by: NURSE PRACTITIONER

## 2024-09-03 PROCEDURE — 3077F SYST BP >= 140 MM HG: CPT | Performed by: NURSE PRACTITIONER

## 2024-09-03 PROCEDURE — 99214 OFFICE O/P EST MOD 30 MIN: CPT | Performed by: NURSE PRACTITIONER

## 2024-09-03 PROCEDURE — 3079F DIAST BP 80-89 MM HG: CPT | Performed by: NURSE PRACTITIONER

## 2024-09-03 PROCEDURE — 1159F MED LIST DOCD IN RCRD: CPT | Performed by: NURSE PRACTITIONER

## 2024-09-03 RX ORDER — ONDANSETRON 8 MG/1
8 TABLET, FILM COATED ORAL EVERY 8 HOURS PRN
Qty: 30 TABLET | Refills: 1 | Status: SHIPPED | OUTPATIENT
Start: 2024-09-03

## 2024-09-03 RX ORDER — LOSARTAN POTASSIUM 100 MG/1
100 TABLET ORAL DAILY
Qty: 90 TABLET | Refills: 3 | Status: SHIPPED | OUTPATIENT
Start: 2024-09-03

## 2024-09-03 RX ORDER — METOPROLOL TARTRATE 25 MG/1
12.5 TABLET, FILM COATED ORAL 2 TIMES DAILY
Qty: 90 TABLET | Refills: 3 | Status: SHIPPED | OUTPATIENT
Start: 2024-09-03

## 2024-09-03 RX ORDER — EZETIMIBE 10 MG/1
10 TABLET ORAL DAILY
Qty: 90 TABLET | Refills: 3 | Status: SHIPPED | OUTPATIENT
Start: 2024-09-03

## 2024-09-03 RX ORDER — MECLIZINE HYDROCHLORIDE 25 MG/1
25 TABLET ORAL 3 TIMES DAILY PRN
Qty: 30 TABLET | Refills: 1 | Status: SHIPPED | OUTPATIENT
Start: 2024-09-03

## 2024-09-03 RX ORDER — ROSUVASTATIN CALCIUM 20 MG/1
20 TABLET, COATED ORAL DAILY
Qty: 90 TABLET | Refills: 3 | Status: SHIPPED | OUTPATIENT
Start: 2024-09-03

## 2024-09-03 NOTE — PROGRESS NOTES
Chief Complaint   Patient presents with    Follow-up     Cardiac management    Lab     Last labs a week ago per PCP.    Dizziness     Having current episode that he feels is vertigo.     Med Refill     Needs refills on cardiac medications-90 day     Subjective       Raciel Peter is a 68 y.o. male with HTN, hyperlipidemia, and IHD.  On 11/4/2019, he developed severe chest tightness, presented to [facility] ER, given NTG paste, symptoms improved.  EKG and troponin negative.  Stress test showed anterior and inferior ischemia.  Cardiac cath revealed a 75-85% stenosis of the LAD with FFR noted at 0.78, single Resolute stent placed to the proximal/mid portion of the LAD.  The diagonal then became jailed and PTCA was done.  At the end of the stent, there was another area of stenosis of around 75-85% and another single Resolute stent was placed.  He was discharged with Brilinta, aspirin, Lipitor. Later, Lipitor changed to Crestor which has been well-tolerated. Labs 1/11/2022 showed LDL improved from 120 to 93, Creatinine stable at 1.3, H/H increased to 19/57.  He was seen by Dr. Hodges, primary versus secondary polycythemia.  ABG normal according to him.  Advised on smoking cessation and he was able to quit. CBC returned to normal.      He returns today for follow-up visit. He woke up at 4 AM with vertigo/room spinning sensation, nausea, no vomiting.  Symptoms similar to his typical vertigo episodes. Denies chest pain, shortness of breath. He is light sensitive.  Mild staggering gait per nurse.  No slurred speech, unilateral weakness or facial drooping.  Denies headache.  Labs with PCP last week/8/28/2024: B12 659, folate 13.6, A1c 5.8%, CBC normal, H/H16.7/50.9, platelets 208, vitamin D 36, , , HDL 38, LDL 53, glucose 108, BUN/CR 18/1.21, GFR 65, normal electrolytes, normal LFT, PSA 2.4, TSH 2.4.       Cardiac History:    Past Surgical History:   Procedure Laterality Date    CARDIAC CATHETERIZATION  11/08/2019     75-85% LAD. FFR- 0.78. 2.75x26 & 2.5x8 Resolute Stents. PTCA D1    CARDIOVASCULAR STRESS TEST  11/05/2019    @ [facility]. Dr. Aguilar- 4 Min.35 Secs. 85% THR. EF 61%. Anterior & Inferior Ischemia.    ECHO - CONVERTED  11/04/2019    @ [facility]. Dr. Hernandez- EF 60%. RVSP- 32 mmHg.     Current Outpatient Medications   Medication Sig Dispense Refill    aspirin (aspirin) 81 MG EC tablet Take 1 tablet by mouth Daily.      Cyanocobalamin (VITAMIN B12 PO) Take  by mouth Daily.      ezetimibe (ZETIA) 10 MG tablet Take 1 tablet by mouth Daily. 90 tablet 3    losartan (COZAAR) 100 MG tablet Take 1 tablet by mouth Daily. 90 tablet 3    metoprolol tartrate (LOPRESSOR) 25 MG tablet Take 0.5 tablets by mouth 2 (Two) Times a Day. 90 tablet 3    Neomycin-Polymyxin-Dexameth 0.1 % ointment Apply  to eye(s) as directed by provider Daily As Needed.      nitroglycerin (NITROSTAT) 0.4 MG SL tablet Place 1 tablet under the tongue Every 5 (Five) Minutes As Needed for Chest Pain. Take no more than 3 doses in 15 minutes. Need appt for further refills. 25 tablet 11    rosuvastatin (CRESTOR) 20 MG tablet Take 1 tablet by mouth Daily. 90 tablet 3    ticagrelor (Brilinta) 60 MG tablet tablet Take 1 tablet by mouth 2 (Two) Times a Day. 180 tablet 3    VITAMIN D PO Take  by mouth Daily.      meclizine (ANTIVERT) 25 MG tablet Take 1 tablet by mouth 3 (Three) Times a Day As Needed for Dizziness. 30 tablet 1    ondansetron (Zofran) 8 MG tablet Take 1 tablet by mouth Every 8 (Eight) Hours As Needed for Nausea or Vomiting. 30 tablet 1     No current facility-administered medications for this visit.     Shellfish-derived products and Lipitor [atorvastatin calcium]    Past Medical History:   Diagnosis Date    History of hernia surgery     Hx of hernia repair 06/17/2022    Hyperlipidemia     Hypertension      Social History     Socioeconomic History    Marital status: Single   Tobacco Use    Smoking status: Former     Types: Electronic Cigarette     Passive  "exposure: Past    Smokeless tobacco: Never    Tobacco comments:     trying to quit again    Vaping Use    Vaping status: Some Days    Substances: Nicotine, Flavoring    Devices: Disposable   Substance and Sexual Activity    Alcohol use: Never    Drug use: Never    Sexual activity: Defer     History reviewed. No pertinent family history.    Review of Systems   Constitutional: Positive for malaise/fatigue (Today only related to vertigo, not typically) and weight loss (-4). Negative for decreased appetite.   HENT: Negative.     Eyes:  Positive for photophobia (Mild). Negative for blurred vision and visual disturbance.   Cardiovascular:  Negative for chest pain, dyspnea on exertion, leg swelling, palpitations and syncope.   Respiratory:  Negative for shortness of breath and sleep disturbances due to breathing.    Endocrine: Negative.    Hematologic/Lymphatic: Negative for bleeding problem. Does not bruise/bleed easily.   Skin: Negative.    Musculoskeletal:  Negative for falls and myalgias.   Gastrointestinal:  Positive for nausea. Negative for abdominal pain, heartburn and melena.   Genitourinary:  Negative for hematuria.   Neurological:  Positive for vertigo. Negative for dizziness and light-headedness.   Psychiatric/Behavioral:  Negative for altered mental status.    Allergic/Immunologic: Negative.       Objective     /80 (BP Location: Left arm, Patient Position: Sitting)   Pulse 60   Ht 177.8 cm (70\")   Wt 97.9 kg (215 lb 12.8 oz)   BMI 30.96 kg/m²     Vitals and nursing note reviewed.   Constitutional:       General: Not in acute distress.     Appearance: Well-developed. Not diaphoretic.   Eyes:      Pupils: Pupils are equal, round, and reactive to light.   HENT:      Head: Normocephalic.   Pulmonary:      Effort: Pulmonary effort is normal. No respiratory distress.      Breath sounds: Normal breath sounds.   Cardiovascular:      Normal rate. Regular rhythm.   Pulses:     Intact distal pulses.   Edema:     " Peripheral edema absent.   Abdominal:      General: Bowel sounds are normal.      Palpations: Abdomen is soft.   Musculoskeletal: Normal range of motion.      Cervical back: Normal range of motion. Skin:     General: Skin is warm and dry.   Neurological:      Mental Status: Alert and oriented to person, place, and time.        Procedures          Problem List Items Addressed This Visit          Cardiac and Vasculature    IHD (ischemic heart disease) - Primary    Relevant Medications    metoprolol tartrate (LOPRESSOR) 25 MG tablet    ticagrelor (Brilinta) 60 MG tablet tablet    Essential hypertension    Relevant Medications    losartan (COZAAR) 100 MG tablet    metoprolol tartrate (LOPRESSOR) 25 MG tablet    Pure hypercholesterolemia    Relevant Medications    rosuvastatin (CRESTOR) 20 MG tablet    ezetimibe (ZETIA) 10 MG tablet     Other Visit Diagnoses       Vertigo               Vertigo  -Sensation of room spinning  -Symptoms resolved with sitting still  -No alarm findings on exam  -Zofran and meclizine as needed given  -Advised to report response/change in symptoms later today  -Advised to avoid driving, called coworker to pick him up    CAD  -s/p stenting LAD x 2, PTCA diagonal, 2019  -continue Brilinta 60 mg BID, aspirin 81 mg  -Denies cardiac symptoms, continue to monitor     HTN  -Stable  -continue losartan, metoprolol      Hyperlipidemia  -excellent LDL control at 53 after adding Zetia  -continue Crestor, Zetia      Follow-up 6 months, sooner if needed.          Electronically signed by PHILIPP Dunbar,  September 3, 2024 10:54 EDT

## 2025-03-11 ENCOUNTER — OFFICE VISIT (OUTPATIENT)
Dept: CARDIOLOGY | Facility: CLINIC | Age: 70
End: 2025-03-11
Payer: MEDICARE

## 2025-03-11 VITALS
SYSTOLIC BLOOD PRESSURE: 110 MMHG | HEIGHT: 70 IN | HEART RATE: 64 BPM | DIASTOLIC BLOOD PRESSURE: 64 MMHG | WEIGHT: 226 LBS | BODY MASS INDEX: 32.35 KG/M2

## 2025-03-11 DIAGNOSIS — E78.00 PURE HYPERCHOLESTEROLEMIA: ICD-10-CM

## 2025-03-11 DIAGNOSIS — I25.9 IHD (ISCHEMIC HEART DISEASE): Primary | ICD-10-CM

## 2025-03-11 DIAGNOSIS — I10 ESSENTIAL HYPERTENSION: ICD-10-CM

## 2025-03-11 PROCEDURE — 1159F MED LIST DOCD IN RCRD: CPT | Performed by: NURSE PRACTITIONER

## 2025-03-11 PROCEDURE — 3074F SYST BP LT 130 MM HG: CPT | Performed by: NURSE PRACTITIONER

## 2025-03-11 PROCEDURE — 99214 OFFICE O/P EST MOD 30 MIN: CPT | Performed by: NURSE PRACTITIONER

## 2025-03-11 PROCEDURE — 1160F RVW MEDS BY RX/DR IN RCRD: CPT | Performed by: NURSE PRACTITIONER

## 2025-03-11 PROCEDURE — 3078F DIAST BP <80 MM HG: CPT | Performed by: NURSE PRACTITIONER

## 2025-03-11 RX ORDER — EZETIMIBE 10 MG/1
10 TABLET ORAL DAILY
Qty: 90 TABLET | Refills: 3 | Status: SHIPPED | OUTPATIENT
Start: 2025-03-11

## 2025-03-11 RX ORDER — METOPROLOL TARTRATE 25 MG/1
12.5 TABLET, FILM COATED ORAL 2 TIMES DAILY
Qty: 90 TABLET | Refills: 3 | Status: SHIPPED | OUTPATIENT
Start: 2025-03-11

## 2025-03-11 RX ORDER — LOSARTAN POTASSIUM 100 MG/1
100 TABLET ORAL DAILY
Qty: 90 TABLET | Refills: 3 | Status: SHIPPED | OUTPATIENT
Start: 2025-03-11

## 2025-03-11 RX ORDER — ROSUVASTATIN CALCIUM 20 MG/1
20 TABLET, COATED ORAL DAILY
Qty: 90 TABLET | Refills: 3 | Status: SHIPPED | OUTPATIENT
Start: 2025-03-11

## 2025-03-11 NOTE — PROGRESS NOTES
Chief Complaint   Patient presents with    Follow-up     Cardiac management    Lab    Med Refill     Will need refills on cardiac medications-90 day     Subjective     HPI    Raciel Peter is a 69 y.o. male with HTN, hyperlipidemia, and IHD.  On 11/4/2019, he developed severe chest tightness, presented to [facility] ER, given NTG paste, symptoms improved.  EKG and troponin negative.  Stress test showed anterior and inferior ischemia.  Cardiac cath revealed 75-85% stenosis of the LAD with FFR noted at 0.78, single Resolute stent placed to the proximal/mid portion of the LAD.  The diagonal then became jailed and PTCA was done.  At the end of the stent, there was another area of stenosis of around 75-85% and another single Resolute stent was placed.  He was discharged with Brilinta, aspirin, Lipitor. Later, Lipitor changed to Crestor. He quit smoking.      He returns today for follow-up visit. Cardiac symptoms denied. No CP, SOB, palpitations, weakness, fatigue. He tolerates exertional activities without symptoms. Labs with PCP 3/5/25:  B12 981, A1C improved to 5.7% (5.8%), LDL 53, HDL 35, Tri 115, normal CBC, CMP. GFR 58.    Cardiac History:    Past Surgical History:   Procedure Laterality Date    CARDIAC CATHETERIZATION  11/08/2019    75-85% LAD. FFR- 0.78. 2.75x26 & 2.5x8 Resolute Stents. PTCA D1    CARDIOVASCULAR STRESS TEST  11/05/2019    @ [facility]. Dr. Aguilar- 4 Min.35 Secs. 85% THR. EF 61%. Anterior & Inferior Ischemia.    ECHO - CONVERTED  11/04/2019    @ [facility]. Dr. Hernandez- EF 60%. RVSP- 32 mmHg.     Current Outpatient Medications   Medication Sig Dispense Refill    aspirin (aspirin) 81 MG EC tablet Take 1 tablet by mouth Daily.      Cyanocobalamin (VITAMIN B12 PO) Take  by mouth Daily.      ezetimibe (ZETIA) 10 MG tablet Take 1 tablet by mouth Daily. 90 tablet 3    losartan (COZAAR) 100 MG tablet Take 1 tablet by mouth Daily. 90 tablet 3    meclizine (ANTIVERT) 25 MG tablet Take 1 tablet by mouth 3 (Three) Times a  Day As Needed for Dizziness. 30 tablet 1    metoprolol tartrate (LOPRESSOR) 25 MG tablet Take 0.5 tablets by mouth 2 (Two) Times a Day. 90 tablet 3    Neomycin-Polymyxin-Dexameth 0.1 % ointment Apply  to eye(s) as directed by provider Daily As Needed.      nitroglycerin (NITROSTAT) 0.4 MG SL tablet Place 1 tablet under the tongue Every 5 (Five) Minutes As Needed for Chest Pain. Take no more than 3 doses in 15 minutes. Need appt for further refills. 25 tablet 11    ondansetron (Zofran) 8 MG tablet Take 1 tablet by mouth Every 8 (Eight) Hours As Needed for Nausea or Vomiting. 30 tablet 1    rosuvastatin (CRESTOR) 20 MG tablet Take 1 tablet by mouth Daily. 90 tablet 3    ticagrelor (Brilinta) 60 MG tablet tablet Take 1 tablet by mouth 2 (Two) Times a Day. 180 tablet 3    VITAMIN D PO Take  by mouth Daily.       No current facility-administered medications for this visit.     Shellfish-derived products and Lipitor [atorvastatin calcium]    Past Medical History:   Diagnosis Date    History of hernia surgery     Hx of hernia repair 06/17/2022    Hyperlipidemia     Hypertension      Social History     Socioeconomic History    Marital status: Single   Tobacco Use    Smoking status: Former     Types: Electronic Cigarette     Passive exposure: Past    Smokeless tobacco: Never    Tobacco comments:     trying to quit again    Vaping Use    Vaping status: Some Days    Substances: Nicotine, Flavoring    Devices: Disposable   Substance and Sexual Activity    Alcohol use: Never    Drug use: Never    Sexual activity: Defer     History reviewed. No pertinent family history.    Review of Systems   Constitutional: Positive for weight gain (+11). Negative for decreased appetite and malaise/fatigue.   HENT: Negative.     Eyes:  Negative for blurred vision.   Cardiovascular:  Negative for chest pain, dyspnea on exertion, leg swelling, palpitations and syncope.   Respiratory:  Negative for shortness of breath and sleep disturbances due to  "breathing.    Endocrine: Negative.    Hematologic/Lymphatic: Negative for bleeding problem. Does not bruise/bleed easily.   Skin: Negative.    Musculoskeletal:  Negative for falls and myalgias.   Gastrointestinal:  Negative for abdominal pain, heartburn and melena.   Genitourinary:  Negative for hematuria.   Neurological:  Negative for dizziness and light-headedness.   Psychiatric/Behavioral:  Negative for altered mental status.    Allergic/Immunologic: Negative.       Objective     /64 (BP Location: Right arm, Patient Position: Sitting)   Pulse 64   Ht 177.8 cm (70\")   Wt 103 kg (226 lb)   BMI 32.43 kg/m²     Vitals and nursing note reviewed.   Constitutional:       General: Not in acute distress.     Appearance: Well-developed. Not diaphoretic.   Eyes:      Pupils: Pupils are equal, round, and reactive to light.   HENT:      Head: Normocephalic.   Pulmonary:      Effort: Pulmonary effort is normal. No respiratory distress.      Breath sounds: Normal breath sounds.   Cardiovascular:      Normal rate. Regular rhythm.      Murmurs: There is a grade 2/6 systolic murmur.   Pulses:     Intact distal pulses.   Edema:     Peripheral edema absent.   Abdominal:      General: Bowel sounds are normal.      Palpations: Abdomen is soft.   Musculoskeletal: Normal range of motion.      Cervical back: Normal range of motion. Skin:     General: Skin is warm and dry.   Neurological:      Mental Status: Alert and oriented to person, place, and time.        Procedures          Problem List Items Addressed This Visit          Cardiac and Vasculature    IHD (ischemic heart disease) - Primary    Relevant Medications    metoprolol tartrate (LOPRESSOR) 25 MG tablet    ticagrelor (Brilinta) 60 MG tablet tablet    Essential hypertension    Relevant Medications    losartan (COZAAR) 100 MG tablet    metoprolol tartrate (LOPRESSOR) 25 MG tablet    Pure hypercholesterolemia    Relevant Medications    rosuvastatin (CRESTOR) 20 MG tablet "    ezetimibe (ZETIA) 10 MG tablet      CAD  -s/p stenting LAD x 2, PTCA diagonal, 2019  -continue Brilinta 60 mg BID, aspirin 81 mg  -Denies cardiac symptoms  -he prefers conservative management as long as he remains asymptomatic      HTN  -well controlled at 110/64  -continue losartan, metoprolol   -refills sent      Hyperlipidemia  -excellent LDL control at 53 after adding Zetia  -continue Crestor, Zetia      Follow-up 6 months, sooner if needed.    BMI is >= 30 and <35. (Class 1 Obesity). The following options were offered after discussion;: nutrition counseling/recommendations             Electronically signed by PHILIPP Dunbar,  March 13, 2025 09:52 EDT